# Patient Record
Sex: MALE | Race: WHITE | NOT HISPANIC OR LATINO | Employment: STUDENT | URBAN - METROPOLITAN AREA
[De-identification: names, ages, dates, MRNs, and addresses within clinical notes are randomized per-mention and may not be internally consistent; named-entity substitution may affect disease eponyms.]

---
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
## 2021-02-08 ENCOUNTER — OFFICE VISIT - HEALTHEAST (OUTPATIENT)
Dept: FAMILY MEDICINE | Facility: CLINIC | Age: 21
End: 2021-02-08

## 2021-02-08 DIAGNOSIS — N50.89 TESTICULAR MASS: ICD-10-CM

## 2021-02-08 DIAGNOSIS — R21 RASH OF GENITAL AREA: ICD-10-CM

## 2021-05-26 VITALS — DIASTOLIC BLOOD PRESSURE: 70 MMHG | HEART RATE: 80 BPM | SYSTOLIC BLOOD PRESSURE: 102 MMHG

## 2021-05-30 NOTE — TELEPHONE ENCOUNTER
Patient Returning Call  Reason for call:Cait returning Erika call  Information relayed to patient: below message relayed to patient.   Patient has additional questions:  Yes  If YES, what are your questions/concerns: Patient currently taking  Doxycycline (VIBRAMYCIN) 100 MG capsule   Lyme's disease - all lab results Negative, should patient continue medication, Please call and advise ASAP.   Okay to leave a detailed message?:  Yes

## 2021-05-30 NOTE — TELEPHONE ENCOUNTER
New patient to [facility].    Fever and seen in ED 2 times recently. Apple size red merna on upper thigh Monday and left scrotum. Dx cellulitis.  Temp now 99.  Red merna now bigger.  Blotches all over body started out as mosquito bite size and getting bigger.  Dark in middle. No bullseye. Mother not sure what it is.  Eating less than usual.  Wt 130#.    Severe HA now.    Has  Been taking antibiotic Keflex since Monday and no doses missed.      Mother is very concerned.  Looks weak. No appetite.  Nauseous.  Cellulitis looking worse today.  Today is day 3 of antibiotic and not improving. Center area of cellulitis is black in color.    Triage nurse could not get him into with [facility] today, so referred him to ED again for hydration, nausea meds, and probably IV antibiotics.    Mother agreed with plan.    Anusha Maher, RN, [facility] Nurse Triage/Med Refills RN     Reason for Disposition    Black (necrotic) tissue or blisters develop in cellulitis    Widespread, bright red, sunburn-like rash and new-onset    Cellulitis on ear or face and getting WORSE    Protocols used: CELLULITIS ON ANTIBIOTIC FOLLOW-UP CALL-P-[place]

## 2021-05-30 NOTE — TELEPHONE ENCOUNTER
Test Results  Who is calling?:  Mother   Who ordered the test:  ER Dr Gamble   Type of test: Lab  Date of test: 7/17/19  Where was the test performed:  [facility]  What are your questions/concerns?:  The mother was told to start care for patient and primary provider will give the results for er visit.  The labs are not final.  The mother did not schedule nor will she schedule an upcoming appointment.  Per writer manager encounter closed.   Okay to leave a detailed message?:  No [phone]

## 2021-05-30 NOTE — TELEPHONE ENCOUNTER
Patient is in process of transferring care from [facility]. Patient was seen in ER on 7/17. Mom is wondering about lab results. Looks like one of the lab result is still pending. She would like a call back as soon as lab results come in. Please contact her in regards to this. She has an appt set up with Pamella but could not get in until the 29th of June.

## 2021-05-30 NOTE — TELEPHONE ENCOUNTER
Reason contacted:  Results   Information relayed:  Below message. Patient already has an appointment scheduled on 7/29/2019.   Additional questions:  No  Further follow-up needed:  No  Okay to leave a detailed message:  No

## 2021-05-30 NOTE — TELEPHONE ENCOUNTER
He does not have a PCP.  I would recommend he schedule a f/u appointment with a PCP to discuss his symptoms and treatment options further.

## 2021-05-30 NOTE — TELEPHONE ENCOUNTER
"Mother (Cait) is the caller.  Child had ED eval for fever and R-sided chest pain.  Ruled out pneumonia and discharged.    Mother states:  \"Still having fevers every day since ED eval (48 hours ago).\"  Temp today \"101\".  Every day temps range \"100 to 102.5.\"  Also \"vomiting daily.\"  \"Wakes up drenched with sweat and vomits right away.\"    Mother states \"Today he finally showed me his groin.\"  \"Size of purple grapefruit at L groin, and same huge purple swelling on L testicle.\"   Today tells mother \"Hurts to walk.\"    Advised returning to ED immediately.  Mother agrees to facilitate.    Liya Francisco RN BSBA  [facility] RN Triage     Reason for Disposition    Pain in scrotum or testicle    Swollen scrotum now (Exception: painless swelling goes away when push on it or teen with painless mass in scrotum)    Child sounds very sick or weak to triager     Accompanying fever with vomiting ...    Protocols used: SCROTUM SWELLING OR PAIN-P-OH      "

## 2021-05-30 NOTE — TELEPHONE ENCOUNTER
Who is calling: Cait - mom  Reason for Call: Please call mom with results asap  Patient is a little better, mom questions plan of care- results are in CHART.  Please call her back today.    Date of last appointment with primary care: 7/17/19 ED- Pt has appointment 7/29/19 - [facility]    Has the patient been recently seen:  Yes  Okay to leave a detailed message: Yes

## 2021-06-04 VITALS
HEIGHT: 70 IN | DIASTOLIC BLOOD PRESSURE: 60 MMHG | SYSTOLIC BLOOD PRESSURE: 100 MMHG | HEART RATE: 88 BPM | WEIGHT: 129.44 LBS | BODY MASS INDEX: 18.53 KG/M2

## 2021-06-05 VITALS
SYSTOLIC BLOOD PRESSURE: 108 MMHG | OXYGEN SATURATION: 95 % | DIASTOLIC BLOOD PRESSURE: 72 MMHG | WEIGHT: 135 LBS | BODY MASS INDEX: 19.55 KG/M2 | HEART RATE: 77 BPM

## 2021-06-05 VITALS
SYSTOLIC BLOOD PRESSURE: 120 MMHG | HEART RATE: 92 BPM | DIASTOLIC BLOOD PRESSURE: 80 MMHG | WEIGHT: 132.13 LBS | BODY MASS INDEX: 19.13 KG/M2 | TEMPERATURE: 98.3 F

## 2021-06-11 NOTE — PATIENT INSTRUCTIONS - HE
Passed vision screening  Technically failed part of left ear hearing screen - needs repeat hearing test (referral placed)    Cough: likely related to allergy/irritant in the room  -regularly wash/clean room and bedding  -if symptoms start up then would recommend using oral antihistamine before bed (claritin or allegra but could use benadryl)    Shoulder issue: recommend referral and evaluation with sports med doctor - concern for recurrent bilateral shoulder dislocations (this can be damaging to joints if not addressed)

## 2021-06-11 NOTE — PROGRESS NOTES
ASSESSMENT and PLAN:    #1.  Multidirectional instability of both shoulders.  I am going to refer him to physical therapy for further treatment.  Since he has had multiple dislocations of both shoulders I do not believe that surgery is indicated as the first-line treatment for this.  He will follow-up in 6 to 8 weeks if he is not improved and we can consider further work-up at that time.    Problem List Items Addressed This Visit     None      Visit Diagnoses     Instability of both shoulder joints    -  Primary    Relevant Orders    Ambulatory referral to Adult PT- Internal          There are no Patient Instructions on file for this visit.    There are no discontinued medications.    No follow-ups on file.    CHIEF COMPLAINT:  Chief Complaint   Patient presents with     Shoulder Injury     pt states shoulders keep dislocating       HISTORY OF PRESENT ILLNESS:  Zeferino Juarez is a 20 y.o. male  presenting to the clinic today for evaluation of shoulder dislocations.  He states that these have been going on for about the last year, and and they occur in both shoulders.  He states that he has dislocated both shoulders about 10 times over the last year, and will happen when he is playing sports such as basketball.  He will jumped to try to block a shot and if he externally rotates his shoulder he states that the humeral head will tend to dislocate posteriorly more than anteriorly.  He states that he can move his shoulder around and the dislocation will then subsequently reduce on its own.  He does not have a lot of pain associated with these issues.  Past medical and surgical history reviewed.  Medications and allergies were reconciled.  He works as a .  No history of ligamentous laxity in the family.    REVIEW OF SYSTEMS:   Pertinent positives noted in HPI, remainder of ROS is negative.    MEDICATIONS:  No current outpatient medications on file.     No current facility-administered medications for this  visit.        TOBACCO USE:  Social History     Tobacco Use   Smoking Status Never Smoker   Smokeless Tobacco Never Used   Tobacco Comment    vaping most days       VITALS:  Vitals:    09/29/20 1301   BP: 102/70   Pulse: 80     Wt Readings from Last 3 Encounters:   09/16/20 129 lb 7 oz (58.7 kg) (13 %, Z= -1.11)*   07/17/19 133 lb (60.3 kg) (25 %, Z= -0.66)*   07/15/19 134 lb (60.8 kg) (27 %, Z= -0.61)*     * Growth percentiles are based on [facility] (Boys, 2-20 Years) data.         PHYSICAL EXAM:  Constitutional:  Reveals an alert, pleasant male.   HEET: Normocephalic, without obvious abnormality, atraumatic.   Neurologic: Normal gait and station  Psychologic: Normal affect  Shoulders: Range of motion is full to flexion abduction.  Strength is 5 out of 5 to resisted shoulder abduction, internal rotation, external rotation, and supraspinatus testing.  Apprehension sign is positive bilaterally.  Relocation test is positive bilaterally.  He does have increased translation of the humeral head bilaterally in the anterior, posterior, and inferior planes, right greater than left.

## 2021-06-11 NOTE — PROGRESS NOTES
[facility] Well Child Check    ASSESSMENT & PLAN  Zeferino Juarez is a 19 y.o. who has normal growth and normal development.    Diagnoses and all orders for this visit:    Routine general medical examination at a health care facility  19-year well-child check completed today.  Concerns as below.  Vaccines as below.  -     Influenza, Seasonal Quad, PF =/> 6months  -     Meningococcal MCV4P  -     HPV vaccine 9 valent 3 dose IM    Dislocation of shoulder region, unspecified laterality, initial encounter  Patient describes recurrent bilateral shoulder dislocations with specific movements during sports; grinding of bilateral shoulders appreciated today with range of motion but otherwise normal exam.  Recommend referral to sports medicine for further evaluation and management.  -     Ambulatory referral to Sports Medicine    Failed hearing screening  Patient failed hearing screen x2, recommend referral to audiology for follow-up.  -     Ambulatory referral to Audiology    Cough   Based on patient's description I am all suspicious for allergic/irritant cough.  I recommend regular cleaning of room and washing of bedding.  If symptoms occur I recommend oral antihistamine such as Claritin or Allegra.  Return to clinic as needed for this issue.      Return to clinic in 1 year for a Well Child Check or sooner as needed    IMMUNIZATIONS/LABS  Immunizations were reviewed and orders were placed as appropriate.    REFERRALS  Dental:  Recommend routine dental care as appropriate.  Other:  referrals as above    ANTICIPATORY GUIDANCE  I have reviewed age appropriate anticipatory guidance.    HEALTH HISTORY  Do you have any concerns that you'd like to discuss today?: cough and shoulder problem   Cough: usually at night sometimes, started a couple years ago, not present all the time (only when in his room), watery eyes sometimes, sometimes congestion but not necessarily connected, approx 1/week, never picked up/tried albuterol inhaler  (was given in ER July 2019)  Shoulder issue: bilateral shoulder issues, reports falling on R arm and shoulder pops out of socket he feels, same issue on L side but no history injury, started a few months ago (no hx similar issues when playing sports in the past), feels grinding, feels pain when pops out    Do you have any significant health concerns in your family history?: Yes: lung cancer and COPD   Since your last visit, have there been any major changes in your family, such as a move, job change, separation, divorce, or death in the family?: No  Has a lack of transportation kept you from medical appointments?: No    Home  Who lives in your home?:  Mom, Dad, sister and brother     Do you have any concerns about losing your housing?: No  Is your housing safe and comfortable?: Yes  Do you have any trouble with sleep?:  No    Education  What school do you child attend?:  [employer]  What grade are you in?:  collage   How do you perform in school (grades, behavior, attention, homework?: good      Eating  Do you eat regular meals including fruits and vegetables?:  yes  What are you drinking (cow's milk, water, soda, juice, sports drinks, energy drinks, etc)?: water and juice  Have you been worried that you don't have enough food?: No  Do you have concerns about your body or appearance?:  No    Activities  Do you have friends?:  yes  Do you get at least one hour of physical activity per day?:  yes  How many hours a day are you in front of a screen other than for schoolwork (computer, TV, phone)?:  5hr   What do you do for exercise?:  Basketball   Do you have interest/participate in community activities/volunteers/school sports?:  yes    VISION/HEARING  Vision: Completed. See Results  Hearing:  Completed. See Results     Hearing Screening    125Hz 250Hz 500Hz 1000Hz 2000Hz 3000Hz 4000Hz 6000Hz 8000Hz   Right ear:   Pass Pass Pass Pass Pass     Left ear:   Pass Pass Pass Fail Pass        Visual Acuity Screening     "Right eye Left eye Both eyes   Without correction: 20/20 20/20 20/20   With correction:          MENTAL HEALTH SCREENING  No flowsheet data found.  Social-emotional & mental health screening: No screening tool used  No concerns    TB Risk Assessment:  The patient and/or parent/guardian answer positive to:  no known risk of TB    Dyslipidemia Risk Screening  Have either of your parents or any of your grandparents had a stroke or heart attack before age 55?: No  Any parents with high cholesterol or currently taking medications to treat?: No     Dental  When was the last time you saw the dentist?: 6-12 months ago   Parent/Guardian declines the fluoride varnish application today. Fluoride not applied today.      Drugs  Does the patient use tobacco/alcohol/drugs?:  no    Safety  Does the patient have any safety concerns (peer or home)?:  no  Does the patient use safety belts, helmets and other safety equipment?:  yes    Sex  Have you ever had sex?:  No    MEASUREMENTS  Height:  5' 9.69\" (1.77 m)  Weight: 129 lb 7 oz (58.7 kg)  BMI: Body mass index is 18.74 kg/m .  Blood Pressure: 100/60  Blood pressure percentiles are not available for patients who are 18 years or older.    PHYSICAL EXAM  Physical Exam   Constitutional: He is oriented to person, place, and time. He appears well-developed and well-nourished. No distress.   HENT:   Head: Normocephalic and atraumatic.   Right Ear: External ear normal.   Left Ear: External ear normal.   Nose: Nose normal.   Mouth/Throat: Oropharynx is clear and moist.   Eyes: Pupils are equal, round, and reactive to light. Conjunctivae and EOM are normal. Right eye exhibits no discharge. Left eye exhibits no discharge. No scleral icterus.   Neck: Normal range of motion. Neck supple. No thyromegaly present.   Cardiovascular: Normal rate, regular rhythm and normal heart sounds.   No murmur heard.  Pulmonary/Chest: Effort normal and breath sounds normal. No respiratory distress. He has no " wheezes. He has no rales.   Abdominal: Soft. Bowel sounds are normal. He exhibits no distension and no mass. There is no abdominal tenderness. There is no rebound and no guarding.   Genitourinary: Right testis is descended. Left testis is descended.    Genitourinary Comments: Pt deferred exam     Musculoskeletal: Normal range of motion.         General: No edema.      Comments: No joint swelling or deformity. Grinding with bilateral shoulder ROM.   Lymphadenopathy:     He has no cervical adenopathy.   Neurological: He is alert and oriented to person, place, and time. He has normal reflexes. He exhibits normal muscle tone. Coordination normal.   Skin: Skin is warm and dry. No rash noted. He is not diaphoretic.   Psychiatric: He has a normal mood and affect. His behavior is normal. Judgment and thought content normal.

## 2021-06-12 NOTE — PROGRESS NOTES
[facility] Discharge Summary  Patient Name: Zeferino Juarez  Date: 1/11/2021  Referral Diagnosis: [unfilled]  Referring provider: Armen Diggs, *  Visit Diagnosis:   1. Poor posture     2. Instability of both shoulder joints         Goals:  Pt. will demonstrate/verbalize independence in self-management of condition in : 4 weeks    Pt will: improve SPADI by 10% in 8 weeks  Pt will: reduce subluxing episodes by 1x/week in 8 weeks      Patient was seen for  visits from initial eval and NS'd 2 follow-ups  Therapy will be discontinued at this time.  The patient will need a new referral to resume.    Thank you for your referral.  Leia Zuñiga  1/11/2021  1:41 PM  [facility]   Shoulder Initial Evaluation    Patient Name: Zeferino Juarez  Date of evaluation: 10/=29/2020  Referral Diagnosis: Instability of both shoulder joints  Referring provider: Armen Diggs, *  Visit Diagnosis:     ICD-10-CM    1. Poor posture  R29.3    2. Instability of both shoulder joints  M25.311     M25.312        Assessment:    Patient is presenting with reports of instability and B shoulder subluxation. He notes the primary movement to cause theses sensation- reaching under or farther for an object or reaching up for a basketball at end range flexion. PT eval reveals severely poor posture (scapular and thoracic but also hyperextending elbows) that is contributing to his condition and positive fulcrum and relocation tests. No other hypermobility noted on exam and normal static strength. PT initiated HEP and will see the patinet once every two weeks for further exercise progression.    Pt. is appropriate for skilled PT intervention as outlined in the Plan of Care (POC).  Pt. is a good candidate for skilled PT services to improve pain levels and function.    Goals:  Pt. will demonstrate/verbalize independence in self-management of condition in : 4 weeks    Pt will: improve SPADI by 10% in 8 weeks  Pt  "will: reduce subluxing episodes by 1x/week in 8 weeks      Patient's expectations/goals are realistic.    Barriers to Learning or Achieving Goals:  No Barriers.       Plan / Patient Instructions:        Plan of Care:   Communication with: Referral Source  Patient Related Instruction: Nature of Condition;Treatment plan and rationale;Basis of treatment;Posture  Times per Week: 1  Number of Weeks: 8  Number of Visits: 8  Discharge Planning: to independent home program and self-care  Therapeutic Exercise: Strengthening;Stretching;ROM  Neuromuscular Reeducation: posture  Manual Therapy: myofascial release;soft tissue mobilization;joint mobilization  Modalities: cold pack;hot pack      POC and pathology of condition were reviewed with patient.  Pt. is in agreement with the Plan of Care  A Home Exercise Program (HEP) was initiated today.  Plan for next visit: STEFANO ADEN stability, Band shoulder ER, review and correct scapular positioning  .   Subjective:       History of Present Illness:    Zeferino is a 20 y.o. male who presents to therapy today with complaints of B shoulder instability more in the R> L. He does think that he is double jointed.  No noted injuries with lacrosse. Feelings of instability with reaching to get something under the bed. HE    He notes them to \"pop\" out and that's started this year, occurs more when arm is extended above his head.  He does have access to the gym if needed.     No pain.      Functional limitations are described as occurring with:   sports or recreation playing basketball    Patient reports benefit from:  rest         Objective:      Note: Items left blank indicates the item was not performed or not indicated at the time of the evaluation.    Patient Outcome Measures :    Shoulder Pain and Disability Index (SPADI) in %: 17     Scores range from 0-100%, where a score of 0% represents minimal pain and maximal function. The minimal clincically important difference is a score reduction of " 10%.    Shoulder Examination  1. Poor posture     2. Instability of both shoulder joints       Involved side: Right  Posture Observation:      Shoulder/Thoracic complex: Severe bilateral scapular protraction   Bilateral upper extremity  elbow hyoerextension   Severely increased upper thoracic kyphosis  Cervical Clearing: Normal    Shoulder/Elbow ROM    Date: 10/29/20     Shoulder and Elbow ROM ( )   AROM in degrees AROM in degrees AROM in degrees    Right Left Right Left Right Left   Shoulder Flexion (0-180 )         Shoulder Abduction (0-180 )         Shoulder Extension (0-60 )         Shoulder ER (0-90 )         Shoulder IR (0-70 )         Shoulder IR/Ext         Elbow Flexion (150 )         Elbow Extension (0 )          PROM in degrees PROM in degrees PROM in degrees    Right Left Right Left Right Left   Shoulder Flexion (0-180 )         Shoulder Abduction (0-180 )         Shoulder Extension (0-60 )         Shoulder ER (0-90 )         Shoulder IR (0-70 )         Elbow Flexion (150 )         Elbow Extension (0 )           Shoulder/Elbow Strength all 5/5  Date:      Shoulder/Elbow Strength (/5)  Manual Muscle Test (MMT) MMT MMT MMT    Right Left Right Left Right Left   Shoulder Flexion         Supraspinatus         Shoulder Abduction         Shoulder Extension         Shoulder External Rotation         Shoulder Internal Rotation         Elbow Flexion         Elbow Extension         Other:         Other:           Flexibility:     Palpation: moderately increased joint play at B shoulders, thumb WFL's    Shoulder Special Tests     Impingement Cluster Right (+/-) Left (+/-) Rotator Cuff Tests Right (+/-) Left (+/-)   Moreno-Ovi   Drop Arm Sign     Painful Arc   Hornblowers     Infraspinatus Test   ERLS     AC Tests Right (+/-) Left (+/-) IRLS     Active Compression   Labral Tests Right (+/-) Left (+/-)   Crossbody Adduction   Biceps Load Test II     AC Resisted Extension   Jerk Test     GH Instability Tests Right  (+/-) Left (+/-) Celena Test     Sulcus Sign   Biceps Tests Right (+/-) Left (+/-)   Apprehension + + Speed     Relocation + + Lucia dahl     Other:   Other:     Other:   Other:         Treatment Today      Exercises: see flow sheet for date performed  Exercise #1: supine reverse shoulder pendulums x 2 minutes CW/CCW with 5# B  Comment #1: Standing Shoulder flexion and ABD to 90 degrees with 5#  Exercise #2: Shoulder extension and scap setx 15 with green band  Comment #2: All fours- arm extension x 10/arm with 5seconds hold      TREATMENT MINUTES COMMENTS   Evaluation 20    Self-care/ Home management     Manual therapy     Neuromuscular Re-education     Therapeutic Activity     Therapeutic Exercises 25 See flow sheet  Educated extensively on scapular protraction with arm movement and elbow hyper extension on WBing   Gait training     Modality__________________                Total 45    Blank areas are intentional and mean the treatment did not include these items.     PT Evaluation Code: (Please list factors)  Patient History/Comorbidities: There is no problem list on file for this patient.      Examination: brad  Clinical Presentation: stable  Clinical Decision Making: low    Patient History/  Comorbidities Examination  (body structures and functions, activity limitations, and/or participation restrictions) Clinical Presentation Clinical Decision Making (Complexity)   No documented Comorbidities or personal factors 1-2 Elements Stable and/or uncomplicated Low   1-2 documented comorbidities or personal factor 3 Elements Evolving clinical presentation with changing characteristics Moderate   3-4 documented comorbidities or personal factors 4 or more Unstable and unpredictable High                Leia Zuñiga  10/30/2020  3:17 PM

## 2021-06-13 NOTE — PROGRESS NOTES
Assessment/Plan:    1. Rash  Suspect fungal infection, unfortunately hasn't improved with lotrimin. Recommend trial miconazole cream + fluconazole oral as below; discussed tips to promote healing and minimize moisture in this area. If not resolved with this treatment then would consider trial of topical steroid.  - fluconazole (DIFLUCAN) 150 MG tablet; Take 1 tablet (150 mg total) by mouth daily.  Dispense: 7 tablet; Refill: 0  - miconazole (MICATIN) 2 % cream; Apply to affected area 2 times daily for 5-7 days  Dispense: 15 g; Refill: 0      Follow up: as needed    Tricia Mejias MD  [facility]    Subjective:    Patient ID: Zeferino Juarez is a 20 y.o. male is here today for rash    Rash  -started 1-1.5 months ago  -initially thought was getting better but wasn't - lotrimin cream   -in groin  -itchy  -red  -no pus  -no significant change but may have gotten redder over time      Past Medical History:   Diagnosis Date     Lyme disease     2019 - resolved     Past Surgical History:   Procedure Laterality Date     NO PAST SURGERIES       No current outpatient medications on file prior to visit.     No current facility-administered medications on file prior to visit.      No Known Allergies  Social History     Socioeconomic History     Marital status: Single     Spouse name: Not on file     Number of children: Not on file     Years of education: Not on file     Highest education level: Not on file   Occupational History     Not on file   Social Needs     Financial resource strain: Not on file     Food insecurity     Worry: Not on file     Inability: Not on file     Transportation needs     Medical: Not on file     Non-medical: Not on file   Tobacco Use     Smoking status: Never Smoker     Smokeless tobacco: Never Used     Tobacco comment: vaping most days   Substance and Sexual Activity     Alcohol use: Never     Frequency: Never     Drug use: Never     Sexual activity: Never   Lifestyle     Physical  activity     Days per week: Not on file     Minutes per session: Not on file     Stress: Not on file   Relationships     Social connections     Talks on phone: Not on file     Gets together: Not on file     Attends Confucianism service: Not on file     Active member of club or organization: Not on file     Attends meetings of clubs or organizations: Not on file     Relationship status: Not on file     Intimate partner violence     Fear of current or ex partner: Not on file     Emotionally abused: Not on file     Physically abused: Not on file     Forced sexual activity: Not on file   Other Topics Concern     Not on file   Social History Narrative     Not on file     Family History   Problem Relation Age of Onset     No Medical Problems Mother      No Medical Problems Father      No Medical Problems Sister      No Medical Problems Brother      No Medical Problems Maternal Grandmother      No Medical Problems Maternal Grandfather      Other Paternal Grandmother         lung issue     No Medical Problems Paternal Grandfather      Review of systems is as stated in HPI, and the remainder of system review is otherwise negative.    Objective:      /80   Pulse 92   Temp 98.3  F (36.8  C)   Wt 132 lb 2 oz (59.9 kg)   BMI 19.13 kg/m      General appearance: awake, NAD  HEENT: atraumatic, normocephalic, no scleral icterus or injection  Lungs: breathing comfortably on room air  : erythematous macular rash on scrotum and groin, no papules/pustules, no drainage, no folliculitis  Extremities: moving all extremities  Neuro: alert, oriented x3, CNs grossly intact, no focal deficits appreciated  Psych: normal mood/affect/behavior, answering questions appropriately, linear thought process

## 2021-06-13 NOTE — PATIENT INSTRUCTIONS - HE
Rash looks like fungal infection - since this has been going on so long and no significant improvement with lotrimin, will be more aggressive with management now  -start fluconazole (diflucan) 150mg - take 1 tablet daily for 7 days - make sure to take full course  -start miconazole cream two times a day - can stop this early if rash goes away    Call or send Pirq message with update if rash isn't gone in approximately 1 week because then we would consider trying different type of cream/treatment

## 2021-06-15 NOTE — PROGRESS NOTES
Assessment & Plan     Rash of genital area  This is of unclear significance and has not responded to several different antifungals or steroid medications.  Instead of prescribing another topical or oral treatment, referral placed to dermatology per patient preference.  - Ambulatory referral to Dermatology - [facility] [place]    Testicular mass  Not appreciated on exam today.  If it returns, low threshold to ultrasound.        No follow-ups on file.    Lucille Montiel MD  [facility] Ju[facility] is 20 y.o. and presents to clinic today for the following health issues   HPI     Patient complains of a tender left testicle.  He believes there is a small pebble sized mass located there for approximately 3 weeks that eventually self resolved.  He wonders if it drained.  It is no longer tender.    Patient also complains of a rash that comes and goes on his scrotum and shaft of penis.  He has already tried fluconazole, miconazole, clotrimazole, and a steroid.      Review of Systems  none      Objective    /72   Pulse 77   Wt 135 lb (61.2 kg)   SpO2 95%   BMI 19.55 kg/m    Body mass index is 19.55 kg/m .  Physical Exam  Gen: NAD  : No obvious palpable mass appreciated in the scrotum, bilateral testes appear even and are nontender to palpation, no significant evidence for epididymitis, there is an area of poorly defined erythema extending from the top of the left testicle up the shaft for a total length of approximately 2 to 3 inches

## 2021-06-19 ENCOUNTER — HEALTH MAINTENANCE LETTER (OUTPATIENT)
Age: 21
End: 2021-06-19

## 2021-10-09 ENCOUNTER — HEALTH MAINTENANCE LETTER (OUTPATIENT)
Age: 21
End: 2021-10-09

## 2021-12-04 ENCOUNTER — HEALTH MAINTENANCE LETTER (OUTPATIENT)
Age: 21
End: 2021-12-04

## 2022-05-09 ENCOUNTER — TRANSFERRED RECORDS (OUTPATIENT)
Dept: HEALTH INFORMATION MANAGEMENT | Facility: CLINIC | Age: 22
End: 2022-05-09
Payer: COMMERCIAL

## 2022-08-09 ENCOUNTER — TRANSFERRED RECORDS (OUTPATIENT)
Dept: HEALTH INFORMATION MANAGEMENT | Facility: CLINIC | Age: 22
End: 2022-08-09

## 2022-08-26 ENCOUNTER — VIRTUAL VISIT (OUTPATIENT)
Dept: FAMILY MEDICINE | Facility: CLINIC | Age: 22
End: 2022-08-26
Payer: COMMERCIAL

## 2022-08-26 DIAGNOSIS — N52.9 VASCULOGENIC ERECTILE DYSFUNCTION, UNSPECIFIED VASCULOGENIC ERECTILE DYSFUNCTION TYPE: Primary | ICD-10-CM

## 2022-08-26 PROCEDURE — 99214 OFFICE O/P EST MOD 30 MIN: CPT | Mod: GT | Performed by: FAMILY MEDICINE

## 2022-08-26 NOTE — PROGRESS NOTES
Zeferino is a 21 year old who is being evaluated via a billable video visit.      How would you like to obtain your AVS? MyChart  If the video visit is dropped, the invitation should be resent by: Text to cell phone: [phone]  Will anyone else be joining your video visit? No          Assessment & Plan     Vasculogenic erectile dysfunction, unspecified vasculogenic erectile dysfunction type  Patition due to vascular congestion times one. He's worried about this recurring; appropriate counseling was given. He really should come in for a face-to-face visit. He is not a candidate for Viagra type medicationent experienced loss of erec      Optional):[id number]}           No follow-ups on file.    David Ibrahim MD  [facility]    Subjective t experienced loss of erection times one due to vascular congestion and is concerned that this may be a recurring problem. He appears well and healthy. I did discuss causative factors. I feel he should have face-to-face counseling for this shouldn't recur. He did ask eleanor about the fact that he should not take Viagra type drugs at this age in this age group.Follow up with [facility]          Review of Systems   Constitutional, HEENT, cardiovascular, pulmonary, gi and gu systems are negative, except as otherwise noted.      Objective           Vitals:  No vitals were obtained today due to virtual visit.    Physical Exam   GENERAL: Healthy, alert and no distress  EYES: Eyes grossly normal to inspection.  No discharge or erythema, or obvious scleral/conjunctival abnormalities.  RESP: No audible wheeze, cough, or visible cyanosis.  No visible retractions or increased work of breathing.    SKIN: Visible skin clear. No significant rash, abnormal pigmentation or lesions.  NEURO: Cranial nerves grossly intact.  Mentation and speech appropriate for age.  PSYCH: Mentation appears normal, affect normal/bright, judgement and insight intact, normal speech and appearance  well-groomed.                Video-Visit Details    Video Start Time: 10:50 start 11:10 end;LAVON pearson

## 2022-09-11 ENCOUNTER — HEALTH MAINTENANCE LETTER (OUTPATIENT)
Age: 22
End: 2022-09-11

## 2022-09-21 ENCOUNTER — OFFICE VISIT (OUTPATIENT)
Dept: FAMILY MEDICINE | Facility: CLINIC | Age: 22
End: 2022-09-21
Payer: COMMERCIAL

## 2022-09-21 VITALS
TEMPERATURE: 98 F | WEIGHT: 151 LBS | DIASTOLIC BLOOD PRESSURE: 80 MMHG | HEART RATE: 75 BPM | SYSTOLIC BLOOD PRESSURE: 110 MMHG | OXYGEN SATURATION: 100 % | BODY MASS INDEX: 21.62 KG/M2 | HEIGHT: 70 IN

## 2022-09-21 DIAGNOSIS — M25.311 INSTABILITY OF RIGHT SHOULDER JOINT: ICD-10-CM

## 2022-09-21 DIAGNOSIS — M24.411 RECURRENT SUBLUXATION OF RIGHT SHOULDER: ICD-10-CM

## 2022-09-21 DIAGNOSIS — Z01.818 PREOP EXAMINATION: Primary | ICD-10-CM

## 2022-09-21 LAB
ANION GAP SERPL CALCULATED.3IONS-SCNC: 13 MMOL/L (ref 7–15)
BUN SERPL-MCNC: 14.1 MG/DL (ref 6–20)
CALCIUM SERPL-MCNC: 9.4 MG/DL (ref 8.6–10)
CHLORIDE SERPL-SCNC: 104 MMOL/L (ref 98–107)
CREAT SERPL-MCNC: 0.88 MG/DL (ref 0.67–1.17)
DEPRECATED HCO3 PLAS-SCNC: 25 MMOL/L (ref 22–29)
ERYTHROCYTE [DISTWIDTH] IN BLOOD BY AUTOMATED COUNT: 11.6 % (ref 10–15)
GFR SERPL CREATININE-BSD FRML MDRD: >90 ML/MIN/1.73M2
GLUCOSE SERPL-MCNC: 92 MG/DL (ref 70–99)
HCT VFR BLD AUTO: 45.7 % (ref 40–53)
HGB BLD-MCNC: 15.7 G/DL (ref 13.3–17.7)
MCH RBC QN AUTO: 29.3 PG (ref 26.5–33)
MCHC RBC AUTO-ENTMCNC: 34.4 G/DL (ref 31.5–36.5)
MCV RBC AUTO: 85 FL (ref 78–100)
PLATELET # BLD AUTO: 223 10E3/UL (ref 150–450)
POTASSIUM SERPL-SCNC: 4.4 MMOL/L (ref 3.4–5.3)
RBC # BLD AUTO: 5.35 10E6/UL (ref 4.4–5.9)
SODIUM SERPL-SCNC: 142 MMOL/L (ref 136–145)
WBC # BLD AUTO: 6.6 10E3/UL (ref 4–11)

## 2022-09-21 PROCEDURE — 85027 COMPLETE CBC AUTOMATED: CPT | Performed by: FAMILY MEDICINE

## 2022-09-21 PROCEDURE — 99214 OFFICE O/P EST MOD 30 MIN: CPT | Performed by: FAMILY MEDICINE

## 2022-09-21 PROCEDURE — 36415 COLL VENOUS BLD VENIPUNCTURE: CPT | Performed by: FAMILY MEDICINE

## 2022-09-21 PROCEDURE — 80048 BASIC METABOLIC PNL TOTAL CA: CPT | Performed by: FAMILY MEDICINE

## 2022-09-21 ASSESSMENT — PAIN SCALES - GENERAL: PAINLEVEL: NO PAIN (0)

## 2022-09-21 NOTE — PROGRESS NOTES
[facility]  [address] [facility] [address]  [facility] [address]  Phone: [phone]  Fax: [phone]  Primary Provider: Tricia Mejias    PREOPERATIVE EVALUATION:  Today's date: 9/21/2022    Zeferino Juarez is a 21 year old male who presents for a preoperative evaluation.    Surgical Information:  Surgery/Procedure: Shoulder surgery - R shoulder arthroscopy, bankart repair, SLAP repair  Surgery Location: [facility]   Surgeon: Patrick Sun   Surgery Date: 09/29/2022  Time of Surgery: not yet determined  Where patient plans to recover: At home with family  Fax number for surgical facility: [phone]    Type of Anesthesia Anticipated: to be determined    Assessment & Plan     The proposed surgical procedure is considered INTERMEDIATE risk.    Preop examination  Instability of right shoulder joint  Recurrent subluxation of right shoulder  Preop exam completed today for upcoming R shoulder surgery. Labs as below. No EKG indicated. Pt not taking any medications - reviewed no ibuprofen for 7 days before surgery.   - CBC with platelets  - Basic metabolic panel  (Ca, Cl, CO2, Creat, Gluc, K, Na, BUN)      Risks and Recommendations:  The patient has the following additional risks and recommendations for perioperative complications:   - No identified additional risk factors other than previously addressed    Medication Instructions:  Patient is on no chronic medications    RECOMMENDATION:  APPROVAL GIVEN to proceed with proposed procedure, without further diagnostic evaluation.      Subjective     HPI related to upcoming procedure: bilateral shoulder instability - starting with R shoulder - 6 weeks in sling, 5 months before return sports    Preop Questions 9/21/2022   1. Have you ever had a heart attack or stroke? No   2. Have you ever had surgery on your heart or blood vessels, such as a stent placement, a coronary artery bypass, or surgery on an artery in  your head, neck, heart, or legs? No   3. Do you have chest pain with activity? No   4. Do you have a history of  heart failure? No   5. Do you currently have a cold, bronchitis or symptoms of other infection? No   6. Do you have a cough, shortness of breath, or wheezing? No   7. Do you or anyone in your family have previous history of blood clots? No   8. Do you or does anyone in your family have a serious bleeding problem such as prolonged bleeding following surgeries or cuts? No   9. Have you ever had problems with anemia or been told to take iron pills? No   10. Have you had any abnormal blood loss such as black, tarry or bloody stools? No   11. Have you ever had a blood transfusion? No   12. Are you willing to have a blood transfusion if it is medically needed before, during, or after your surgery? Yes   13. Have you or any of your relatives ever had problems with anesthesia? No   14. Do you have sleep apnea, excessive snoring or daytime drowsiness? No   15. Do you have any artifical heart valves or other implanted medical devices like a pacemaker, defibrillator, or continuous glucose monitor? No   16. Do you have artificial joints? No   17. Are you allergic to latex? No       Health Care Directive:  Patient does not have a Health Care Directive or Living Will: full code    Preoperative Review of :   reviewed - no record of controlled substances prescribed.    Review of Systems  Constitutional, neuro, ENT, endocrine, pulmonary, cardiac, gastrointestinal, genitourinary, musculoskeletal, integument and psychiatric systems are negative, except as otherwise noted.    There are no problems to display for this patient.     Past Medical History:   Diagnosis Date     Lyme disease     2019 - resolved     Past Surgical History:   Procedure Laterality Date     NO PAST SURGERIES       Current Outpatient Medications   Medication Sig Dispense Refill     IBUPROFEN PO Take 200 mg by mouth every 6 hours as needed for  "moderate pain (Patient not taking: Reported on 9/21/2022)         No Known Allergies     Social History     Tobacco Use     Smoking status: Never Smoker     Smokeless tobacco: Never Used     Tobacco comment: vaping most days   Substance Use Topics     Alcohol use: Never     Family History   Problem Relation Age of Onset     Anesthesia Reaction Mother      Amblyopia Sister      No Known Problems Mother      No Known Problems Father      No Known Problems Sister      No Known Problems Brother      No Known Problems Maternal Grandmother      No Known Problems Maternal Grandfather      Other - See Comments Paternal Grandmother         lung issue     No Known Problems Paternal Grandfather      History   Drug Use Unknown         Objective     /80   Pulse 75   Temp 98  F (36.7  C)   Ht 1.765 m (5' 9.5\")   Wt 68.5 kg (151 lb)   SpO2 100%   BMI 21.98 kg/m      Physical Exam    GENERAL APPEARANCE: healthy, alert and no distress     EYES: EOMI,  PERRL     HENT: ear canals and TM's normal      NECK: no adenopathy, no asymmetry, masses, or scars and thyroid normal to palpation     RESP: lungs clear to auscultation - no rales, rhonchi or wheezes     CV: regular rates and rhythm, normal S1 S2, no S3 or S4 and no murmur, click or rub     MS: extremities normal- no gross deformities noted, no evidence of inflammation in joints, FROM in all extremities.     SKIN: no suspicious lesions or rashes     NEURO: Normal strength and tone, sensory exam grossly normal, mentation intact and speech normal     PSYCH: mentation appears normal. and affect normal/bright     LYMPHATICS: No cervical adenopathy    Diagnostics:   09/21/22 11:00   WBC 6.6   Hemoglobin 15.7   Hematocrit 45.7   Platelet Count 223   RBC Count 5.35   MCV 85   MCH 29.3   MCHC 34.4   RDW 11.6      09/21/22 11:00   Sodium 142   Potassium 4.4   Chloride 104   Carbon Dioxide (CO2) 25   Urea Nitrogen 14.1   Creatinine 0.88   GFR Estimate >90   Calcium 9.4   Anion Gap " 13   Glucose 92       No EKG indicated    Revised Cardiac Risk Index (RCRI):  The patient has the following serious cardiovascular risks for perioperative complications:   - No serious cardiac risks = 0 points     RCRI Interpretation: 0 points: Class I (very low risk - 0.4% complication rate)         Signed Electronically by: Tricia Mejias MD  Copy of this evaluation report is provided to requesting physician.

## 2022-09-29 ENCOUNTER — TRANSFERRED RECORDS (OUTPATIENT)
Dept: HEALTH INFORMATION MANAGEMENT | Facility: CLINIC | Age: 22
End: 2022-09-29

## 2022-10-10 ENCOUNTER — TRANSFERRED RECORDS (OUTPATIENT)
Dept: HEALTH INFORMATION MANAGEMENT | Facility: CLINIC | Age: 22
End: 2022-10-10

## 2022-11-07 ENCOUNTER — TRANSFERRED RECORDS (OUTPATIENT)
Dept: HEALTH INFORMATION MANAGEMENT | Facility: CLINIC | Age: 22
End: 2022-11-07

## 2023-01-05 ENCOUNTER — TRANSFERRED RECORDS (OUTPATIENT)
Dept: HEALTH INFORMATION MANAGEMENT | Facility: CLINIC | Age: 23
End: 2023-01-05

## 2023-01-23 ENCOUNTER — HEALTH MAINTENANCE LETTER (OUTPATIENT)
Age: 23
End: 2023-01-23

## 2023-01-24 ENCOUNTER — MYC MEDICAL ADVICE (OUTPATIENT)
Dept: FAMILY MEDICINE | Facility: CLINIC | Age: 23
End: 2023-01-24
Payer: COMMERCIAL

## 2023-01-25 NOTE — TELEPHONE ENCOUNTER
"From Office visit 2/8/21:  \"Rash of genital area  This is of unclear significance and has not responded to several different antifungals or steroid medications.  Instead of prescribing another topical or oral treatment, referral placed to dermatology per patient preference.  - Ambulatory referral to Dermatology - [facility] [place]\"    Should patient be seen in clinic first? Referral pended; please advise if appropriate. Unsure of provider's name as it was not listed with above info.    Jose Sher, LUCYN, RN  [facility]          "

## 2024-02-24 ENCOUNTER — HEALTH MAINTENANCE LETTER (OUTPATIENT)
Age: 24
End: 2024-02-24

## 2024-05-01 ENCOUNTER — OFFICE VISIT (OUTPATIENT)
Dept: FAMILY MEDICINE | Facility: CLINIC | Age: 24
End: 2024-05-01
Payer: COMMERCIAL

## 2024-05-01 VITALS
WEIGHT: 158.4 LBS | BODY MASS INDEX: 22.68 KG/M2 | OXYGEN SATURATION: 99 % | DIASTOLIC BLOOD PRESSURE: 79 MMHG | TEMPERATURE: 98.7 F | SYSTOLIC BLOOD PRESSURE: 118 MMHG | HEART RATE: 86 BPM | RESPIRATION RATE: 16 BRPM | HEIGHT: 70 IN

## 2024-05-01 DIAGNOSIS — R05.1 ACUTE COUGH: Primary | ICD-10-CM

## 2024-05-01 PROCEDURE — 99214 OFFICE O/P EST MOD 30 MIN: CPT | Performed by: FAMILY MEDICINE

## 2024-05-01 RX ORDER — AZITHROMYCIN 250 MG/1
TABLET, FILM COATED ORAL
Qty: 6 TABLET | Refills: 0 | Status: SHIPPED | OUTPATIENT
Start: 2024-05-01 | End: 2024-05-06

## 2024-05-01 RX ORDER — ALBUTEROL SULFATE 90 UG/1
2 AEROSOL, METERED RESPIRATORY (INHALATION) EVERY 6 HOURS PRN
Qty: 18 G | Refills: 3 | Status: SHIPPED | OUTPATIENT
Start: 2024-05-01 | End: 2024-07-15

## 2024-05-01 NOTE — PROGRESS NOTES
Assessment/ Plan     1. Acute cough  Zeferino has about 10-day history of nonproductive cough.  I suspect he has a postviral cough.  Unfortunately do not have x-ray here today so we will err on the side of caution with putting him on azithromycin.  Will have him start an albuterol inhaler.  Went over inhaler technique with him.  It sounds like he might have sort of intermittent prolonged cough and possibly has some mild intermittent asthma.  Would recommend that he follow-up with his primary care physician if his cough is not resolving.  He does also need a physical and update on some immunizations.  Would defer on immunizations today as we will be putting him on antibiotics and he is acutely ill.  - azithromycin (ZITHROMAX) 250 MG tablet; Take 2 tablets (500 mg) by mouth daily for 1 day, THEN 1 tablet (250 mg) daily for 4 days.  Dispense: 6 tablet; Refill: 0  - albuterol (PROAIR HFA/PROVENTIL HFA/VENTOLIN HFA) 108 (90 Base) MCG/ACT inhaler; Inhale 2 puffs into the lungs every 6 hours as needed for shortness of breath, wheezing or cough  Dispense: 18 g; Refill: 3    He has a rash behind his ears and groin area.  Recommended combination of 1% hydrocortisone cream and an antifungal cream twice daily.  Subjective:      Zeferino Juarez is a 23 year old male who presents for evaluation of a cough.  He states has been coughing for about 10 days.  He does not initially think that he had a type of virus, but now his sister is coughing so he thinks it might have been something contagious.  He did have a little bit of sore throat when it started.  Has not had any fever or shortness of breath.  He states is worse at night and has a hard time falling asleep and then it keeps him up at night.  It is occasionally productive of somewhat yellow sputum.  He states for the last 6 years or so he has had this intermittent cough that will come for couple months and then goes away and then he will get it back again.  He has not really been  "able to figure out a trigger such as time of year, allergies, virus, etc.  It sounds like he could have some mild intermittent asthma and this can be worked up in the future when he is not acutely ill.  He also wanted to ask me about treatment options for her rash.  He gets kind of a scaly rash behind his ears and then he gets a little bit of a red scaly rash in the groin area.    Relevant past medical, family, surgical, and social history reviewed with patient, unless noted in HPI, not pertinent for this visit.  Medications were discussed and reconciled.   Review of Systems   A 12 point comprehensive review of systems was negative except as noted.      Current Outpatient Medications   Medication Sig Dispense Refill    albuterol (PROAIR HFA/PROVENTIL HFA/VENTOLIN HFA) 108 (90 Base) MCG/ACT inhaler Inhale 2 puffs into the lungs every 6 hours as needed for shortness of breath, wheezing or cough 18 g 3    azithromycin (ZITHROMAX) 250 MG tablet Take 2 tablets (500 mg) by mouth daily for 1 day, THEN 1 tablet (250 mg) daily for 4 days. 6 tablet 0         Objective:     /79   Pulse 86   Temp 98.7  F (37.1  C)   Resp 16   Ht 1.765 m (5' 9.5\")   Wt 71.8 kg (158 lb 6.4 oz)   SpO2 99%   BMI 23.06 kg/m      Body mass index is 23.06 kg/m .       General appearance: alert, appears stated age and cooperative  Head: Normocephalic, without obvious abnormality, atraumatic  Eyes: conjunctivae/corneas clear. PERRL, EOM's intact. Fundi benign.  Ears: normal TM's and external ear canals both ears  Nose: Nares normal. Septum midline. Mucosa normal. No drainage or sinus tenderness.  Throat: lips, mucosa, and tongue normal; teeth and gums normal  Neck: no adenopathy.  Lungs: clear to auscultation bilaterally, frequent tight sounding cough  Heart: regular rate and rhythm, S1, S2 normal, no murmur, click, rub or gallop      No results found for this or any previous visit (from the past 168 hour(s)).       This note has been " dictated using voice recognition software. Any grammatical or context distortions are unintentional and inherent to the software

## 2024-05-01 NOTE — PATIENT INSTRUCTIONS
For the fist week, use your inhaler 4 times daily and after that, as needed.  Follow up with Dr Mejias if the cough is not getting better to discuss next steps and you can also schedule a physical with her when you are feeling better    For the rash - I would use both 1% hydrocortisone cream and an antifungal cream twice daily (lotrimin, lamisil, or lotrimin ultra).  Both of these can be bought over the counter.

## 2024-05-14 ENCOUNTER — OFFICE VISIT (OUTPATIENT)
Dept: FAMILY MEDICINE | Facility: CLINIC | Age: 24
End: 2024-05-14
Payer: COMMERCIAL

## 2024-05-14 VITALS
TEMPERATURE: 97.3 F | OXYGEN SATURATION: 99 % | DIASTOLIC BLOOD PRESSURE: 70 MMHG | HEIGHT: 70 IN | BODY MASS INDEX: 22.64 KG/M2 | RESPIRATION RATE: 21 BRPM | WEIGHT: 158.13 LBS | SYSTOLIC BLOOD PRESSURE: 110 MMHG | HEART RATE: 70 BPM

## 2024-05-14 DIAGNOSIS — N52.9 ERECTILE DYSFUNCTION, UNSPECIFIED ERECTILE DYSFUNCTION TYPE: ICD-10-CM

## 2024-05-14 DIAGNOSIS — F41.1 GAD (GENERALIZED ANXIETY DISORDER): Primary | ICD-10-CM

## 2024-05-14 PROCEDURE — 99214 OFFICE O/P EST MOD 30 MIN: CPT | Performed by: STUDENT IN AN ORGANIZED HEALTH CARE EDUCATION/TRAINING PROGRAM

## 2024-05-14 RX ORDER — SILDENAFIL CITRATE 20 MG/1
20-60 TABLET ORAL DAILY PRN
Qty: 60 TABLET | Refills: 0 | Status: SHIPPED | OUTPATIENT
Start: 2024-05-14

## 2024-05-14 RX ORDER — BUPROPION HYDROCHLORIDE 150 MG/1
150 TABLET ORAL EVERY MORNING
Qty: 30 TABLET | Refills: 1 | Status: SHIPPED | OUTPATIENT
Start: 2024-05-14 | End: 2024-07-15

## 2024-05-14 ASSESSMENT — ANXIETY QUESTIONNAIRES
1. FEELING NERVOUS, ANXIOUS, OR ON EDGE: NEARLY EVERY DAY
3. WORRYING TOO MUCH ABOUT DIFFERENT THINGS: MORE THAN HALF THE DAYS
IF YOU CHECKED OFF ANY PROBLEMS ON THIS QUESTIONNAIRE, HOW DIFFICULT HAVE THESE PROBLEMS MADE IT FOR YOU TO DO YOUR WORK, TAKE CARE OF THINGS AT HOME, OR GET ALONG WITH OTHER PEOPLE: SOMEWHAT DIFFICULT
5. BEING SO RESTLESS THAT IT IS HARD TO SIT STILL: NOT AT ALL
7. FEELING AFRAID AS IF SOMETHING AWFUL MIGHT HAPPEN: NOT AT ALL
GAD7 TOTAL SCORE: 7
GAD7 TOTAL SCORE: 7
6. BECOMING EASILY ANNOYED OR IRRITABLE: SEVERAL DAYS
2. NOT BEING ABLE TO STOP OR CONTROL WORRYING: SEVERAL DAYS

## 2024-05-14 ASSESSMENT — PAIN SCALES - GENERAL: PAINLEVEL: NO PAIN (0)

## 2024-05-14 ASSESSMENT — PATIENT HEALTH QUESTIONNAIRE - PHQ9: 5. POOR APPETITE OR OVEREATING: NOT AT ALL

## 2024-05-14 NOTE — PROGRESS NOTES
"  Assessment and Plan   23-year-old male who presents with erectile dysfunction over the last couple of months.  Seems to be psychogenic in etiology.  Related to anxiety.  Would be quite young for vascular etiology and does not have other symptoms to suggest hypogonadism.  He is taking \"pills\" from a gas station that his friend gave him which I think were likely Viagra.  He did have an erection lasting 12 hours at 1 time with this.  I discussed with him if he has an erection lasting more than 4 hours with medication he needs to go to the ER.  I recommended low-dose 20 mg to start with increasing as needed based on response.  May not need future pills as his confidence may improve with this.  We did discuss risks and benefits of treating his anxiety.  Patient opted to trial Wellbutrin which would have the least amount of sexual side effects.  I recommended he follow-up in a month to see how this is going.    1. BENIGNO (generalized anxiety disorder)  - buPROPion (WELLBUTRIN XL) 150 MG 24 hr tablet; Take 1 tablet (150 mg) by mouth every morning  Dispense: 30 tablet; Refill: 1    2. Erectile dysfunction, unspecified erectile dysfunction type  - sildenafil (REVATIO) 20 MG tablet; Take 1-3 tablets (20-60 mg) by mouth daily as needed (erectile dysfunction)  Dispense: 60 tablet; Refill: 0    Follow up: 1 month for BENIGNO    Options for treatment and follow-up care were reviewed with the patient and/or guardian. Zeferino Juarez and/or guardian engaged in the decision making process and verbalized understanding of the options discussed and agreed with the final plan.    Dr. Sergio Martinez         HPI:   Zeferino Juarez is a 23 year old  male who presents for:    Chief Complaint   Patient presents with    Erectile dysfunction     Patient tells me that over the last 7-8 times he has had intercourse with a woman he has had problems both getting and maintaining an erection.  He gets very anxious when this happens and is worried about his " "ability to perform.  He states his libido is normal.  He is not dating anyone in particular and is not fighting with any of these individuals.  He does feel anxiety in other parts of his life such as talking to new people or going into social situations.  No problems with school with anxiety or now that he has graduated with his interviews for work as an .  He tells me he tried a \"pill\" that his friend gave him from a gas station.  He does not know what it was.  But it seemed to work well.  In fact he actually had an erection lasting 12 hours.  He also notes he took one of his father's pills at a different time again does not know what it was and this did not work.  He has not tried anything else.  No previous damage to his penis or testicles.  No problems in the past with either. He denies weight loss or decreased muscle mass        5/14/2024     7:34 AM   BENIGNO-7 SCORE   Total Score 7              PMHX:   There is no problem list on file for this patient.      Social History     Tobacco Use    Smoking status: Never    Smokeless tobacco: Never    Tobacco comments:     vaping most days   Vaping Use    Vaping status: Never Used   Substance Use Topics    Alcohol use: Never    Drug use: Never       Social History     Social History Narrative    Not on file       No Known Allergies    No results found for this or any previous visit (from the past 24 hour(s)).         Review of Systems:    ROS: 10 point ROS neg other than the symptoms noted above in the HPI.         Physical Exam:     Vitals:    05/14/24 0703   BP: 110/70   Pulse: 70   Resp: 21   Temp: 97.3  F (36.3  C)   SpO2: 99%   Weight: 71.7 kg (158 lb 2 oz)   Height: 1.775 m (5' 9.88\")     Body mass index is 22.77 kg/m .    General appearance: Alert, cooperative, no distress, appears stated age  Head: Normocephalic, atraumatic, without obvious abnormality  Eyes: Pupils equal round, reactive.  Conjunctiva clear.  Nose: Nares normal, no drainage.  Throat: " Lips, mucosa, tongue normal mucosa pink and moist  Neck: Supple, symmetric, trachea midline,  Psych: Normal-appearing hygiene, speech is normal pace and volume, nontangential, noncircumferential, thoughtprocess is logical, does not appear to responding to internal stimuli, no expression of paranoid delusions, mood is anxious

## 2024-07-15 ENCOUNTER — OFFICE VISIT (OUTPATIENT)
Dept: FAMILY MEDICINE | Facility: CLINIC | Age: 24
End: 2024-07-15
Payer: COMMERCIAL

## 2024-07-15 VITALS
HEIGHT: 69 IN | DIASTOLIC BLOOD PRESSURE: 76 MMHG | SYSTOLIC BLOOD PRESSURE: 112 MMHG | BODY MASS INDEX: 22.01 KG/M2 | RESPIRATION RATE: 12 BRPM | WEIGHT: 148.6 LBS | OXYGEN SATURATION: 97 % | HEART RATE: 76 BPM

## 2024-07-15 DIAGNOSIS — N50.89 GENITAL LESION, MALE: Primary | ICD-10-CM

## 2024-07-15 DIAGNOSIS — Z11.3 SCREEN FOR STD (SEXUALLY TRANSMITTED DISEASE): ICD-10-CM

## 2024-07-15 LAB — T PALLIDUM AB SER QL: NONREACTIVE

## 2024-07-15 PROCEDURE — 86803 HEPATITIS C AB TEST: CPT | Performed by: FAMILY MEDICINE

## 2024-07-15 PROCEDURE — 99214 OFFICE O/P EST MOD 30 MIN: CPT | Performed by: FAMILY MEDICINE

## 2024-07-15 PROCEDURE — 87389 HIV-1 AG W/HIV-1&-2 AB AG IA: CPT | Performed by: FAMILY MEDICINE

## 2024-07-15 PROCEDURE — 87529 HSV DNA AMP PROBE: CPT | Performed by: FAMILY MEDICINE

## 2024-07-15 PROCEDURE — 87491 CHLMYD TRACH DNA AMP PROBE: CPT | Performed by: FAMILY MEDICINE

## 2024-07-15 PROCEDURE — 87340 HEPATITIS B SURFACE AG IA: CPT | Performed by: FAMILY MEDICINE

## 2024-07-15 PROCEDURE — 36415 COLL VENOUS BLD VENIPUNCTURE: CPT | Performed by: FAMILY MEDICINE

## 2024-07-15 PROCEDURE — 87591 N.GONORRHOEAE DNA AMP PROB: CPT | Performed by: FAMILY MEDICINE

## 2024-07-15 PROCEDURE — 86780 TREPONEMA PALLIDUM: CPT | Performed by: FAMILY MEDICINE

## 2024-07-15 NOTE — PROGRESS NOTES
Assessment & Plan     (N50.89) Genital lesion, male  (primary encounter diagnosis)  Comment: Acute onset of a genital lesion of unclear cause, is not classic for herpes and it is a solitary lesion  Plan: Hepatitis B surface antigen, Hepatitis C         antibody, HIV Antigen Antibody Combo Cascade,         Neisseria gonorrhoeae PCR, Chlamydia         trachomatis PCR, Treponema Abs w Reflex to RPR         and Titer, HSV Types 1 and 2 Qualitative PCR         CSF             (Z11.3) Screen for STD (sexually transmitted disease)  Comment: Patient has been potentially exposed  Plan: Hepatitis B surface antigen, Hepatitis C         antibody, HIV Antigen Antibody Combo Cascade,         Neisseria gonorrhoeae PCR, Chlamydia         trachomatis PCR, Treponema Abs w Reflex to RPR         and Titer             PLAN:  1.  Urine and laboratory studies as above, as well as a PCR swab and then proceed accordingly.                Rubia Mcgarry is a 23 year old, presenting for the following health issues:  Derm Problem (Genital Area - 1-2 weeks, slightly painful, has had history of these before 2 months ago. )        7/15/2024    10:14 AM   Additional Questions   Roomed by Maggie SALAS CMA   Accompanied by None     History of Present Illness       Reason for visit:  Blister in genital area  Symptom onset:  1-2 weeks ago  Symptoms include:  Blister  Symptom intensity:  Moderate  Symptom progression:  Staying the same  Had these symptoms before:  Yes  Has tried/received treatment for these symptoms:  No  What makes it worse:  No  What makes it better:  No    He eats 2-3 servings of fruits and vegetables daily.He consumes 1 sweetened beverage(s) daily.He exercises with enough effort to increase his heart rate 20 to 29 minutes per day.  He exercises with enough effort to increase his heart rate 3 or less days per week.   He is taking medications regularly.    Patient comes in because of a lesion in the glans penis.  Several months ago  "the patient had a similar lesion he treated this with over-the-counter Lotrimin cream which actually resolved the lesion couple weeks ago a similar appearing lesion started to appear, he has been applying the Lotrimin cream again but the lesion is not going away he may have scratched it there is a bit of a scab and eschar there and it is somewhat painful.  He did have sexual relations with a female probably several months ago or so, does not have a history of STDs.  Told the patient I would like to check him for STDs and also do a swab of the area.    Told the patient we will do a swab and do the blood and urine testing and then proceed accordingly.    Patient is otherwise healthy he is has been on some medication what looks like for anxiety but is not on it currently.                    Objective    /76   Pulse 76   Resp 12   Ht 1.765 m (5' 9.49\")   Wt 67.4 kg (148 lb 9.6 oz)   SpO2 97%   BMI 21.64 kg/m    Body mass index is 21.64 kg/m .  Physical Exam    examination.  The patient has a 5 mm or so slightly raised scab in the area of the glans penis is not draining at this time.              Signed Electronically by: Jose Juan Loving MD    "

## 2024-07-15 NOTE — LETTER
July 16, 2024      Zeferino Juarez  [address]  [place] [address]        Dear ,    We are writing to inform you of your test results.  As discussed the test comes back positive for herpes simplex type II which is the reason for the genital lesion, I faxed in a 10-day course of Valtrex to your local pharmacy, certainly if there is problems difficulties further outbreaks let us know.  -Discussed the rest of the testing for STDs are fortunately negative.        Resulted Orders   Hepatitis B surface antigen   Result Value Ref Range    Hepatitis B Surface Antigen Nonreactive Nonreactive   Hepatitis C antibody   Result Value Ref Range    Hepatitis C Antibody Nonreactive Nonreactive      Comment:      A nonreactive screening test result does not exclude the possibility of exposure to or infection with HCV. Nonreactive screening test results in individuals with prior exposure to HCV may be due to antibody levels below the limit of detection of this assay or lack of reactivity to the HCV antigens used in this assay. Patients with recent HCV infections (<3 months from time of exposure) may have false-negative HCV antibody results due to the time needed for seroconversion (average of 8 to 9 weeks).   HIV Antigen Antibody Combo Cascade   Result Value Ref Range    HIV Antigen Antibody Combo Nonreactive Nonreactive      Comment:      Negative HIV-1 p24 antigen and HIV-1/2 antibody screening test results usually indicate the absence of HIV-1 and HIV-2 infection. However, such negative results do not rule-out acute HIV infection.  If acute HIV-1 or HIV-2 infection is suspected, detection of HIV-1 or HIV-2 RNA  is recommended.    Neisseria gonorrhoeae PCR   Result Value Ref Range    Neisseria gonorrhoeae Negative Negative      Comment:      Negative for N. gonorrhoeae rRNA by transcription mediated amplification. A negative result by transcription mediated amplification does not preclude the presence of C. trachomatis  infection because results are dependent on proper and adequate collection, absence of inhibitors and sufficient rRNA to be detected.   Chlamydia trachomatis PCR   Result Value Ref Range    Chlamydia trachomatis Negative Negative      Comment:      A negative result by transcription mediated amplification does not preclude the presence of C. trachomatis infection because results are dependent on proper and adequate collection, absence of inhibitors and sufficient rRNA to be detected.   Treponema Abs w Reflex to RPR and Titer   Result Value Ref Range    Treponema Antibody Total Nonreactive Nonreactive   HSV Types 1 and 2 Qualitative PCR CSF   Result Value Ref Range    Herpes Simplex Virus 1 DNA Not Detected Not Detected      Comment:      Herpes simplex virus type 1 DNA not detected, presumed negative for HSV-1. A negative result does not rule out the presence of PCR inhibitors or HSV DNA in concentrations below the limit of detection.    Herpes Simplex Virus 2 DNA Detected (A) Not Detected    Narrative    The [employer] Molecular Simplexa HSV 1 & 2 Direct assay on the [employer] MDX instrument is a FDA-approved, real-time PCR test for the qualitative detection and differentiation of Herpes Simplex virus Type 1 & 2 DNA from patients with signs and symptoms of HSV-1 or 2 infection.       If you have any questions or concerns, please call the clinic at the number listed above.       Sincerely,      Jose Juan Loving MD

## 2024-07-16 DIAGNOSIS — N50.89 GENITAL LESION, MALE: Primary | ICD-10-CM

## 2024-07-16 LAB
C TRACH DNA SPEC QL NAA+PROBE: NEGATIVE
HBV SURFACE AG SERPL QL IA: NONREACTIVE
HCV AB SERPL QL IA: NONREACTIVE
HIV 1+2 AB+HIV1 P24 AG SERPL QL IA: NONREACTIVE
HSV1 DNA SPEC QL NAA+PROBE: NOT DETECTED
HSV2 DNA SPEC QL NAA+PROBE: DETECTED
N GONORRHOEA DNA SPEC QL NAA+PROBE: NEGATIVE

## 2024-07-16 RX ORDER — VALACYCLOVIR HYDROCHLORIDE 1 G/1
1000 TABLET, FILM COATED ORAL 2 TIMES DAILY
Qty: 20 TABLET | Refills: 0 | Status: SHIPPED | OUTPATIENT
Start: 2024-07-16 | End: 2024-08-06

## 2024-07-19 ENCOUNTER — MYC MEDICAL ADVICE (OUTPATIENT)
Dept: FAMILY MEDICINE | Facility: CLINIC | Age: 24
End: 2024-07-19
Payer: COMMERCIAL

## 2024-08-06 ENCOUNTER — OFFICE VISIT (OUTPATIENT)
Dept: FAMILY MEDICINE | Facility: CLINIC | Age: 24
End: 2024-08-06
Payer: COMMERCIAL

## 2024-08-06 VITALS
RESPIRATION RATE: 14 BRPM | DIASTOLIC BLOOD PRESSURE: 67 MMHG | SYSTOLIC BLOOD PRESSURE: 107 MMHG | BODY MASS INDEX: 22.81 KG/M2 | HEIGHT: 69 IN | HEART RATE: 77 BPM | TEMPERATURE: 98.6 F | WEIGHT: 154 LBS | OXYGEN SATURATION: 97 %

## 2024-08-06 DIAGNOSIS — N50.89 GENITAL LESION, MALE: ICD-10-CM

## 2024-08-06 DIAGNOSIS — B00.9 HSV (HERPES SIMPLEX VIRUS) INFECTION: Primary | ICD-10-CM

## 2024-08-06 DIAGNOSIS — R21 RASH: ICD-10-CM

## 2024-08-06 PROCEDURE — 99214 OFFICE O/P EST MOD 30 MIN: CPT | Performed by: FAMILY MEDICINE

## 2024-08-06 RX ORDER — VALACYCLOVIR HYDROCHLORIDE 1 G/1
1000 TABLET, FILM COATED ORAL 2 TIMES DAILY
Qty: 28 TABLET | Refills: 0 | Status: SHIPPED | OUTPATIENT
Start: 2024-08-06 | End: 2024-09-18

## 2024-08-06 RX ORDER — TRIAMCINOLONE ACETONIDE 1 MG/G
CREAM TOPICAL 2 TIMES DAILY
Qty: 45 G | Refills: 0 | Status: SHIPPED | OUTPATIENT
Start: 2024-08-06 | End: 2024-09-18

## 2024-08-06 NOTE — PROGRESS NOTES
Assessment & Plan     (B00.9) HSV (herpes simplex virus) infection  (primary encounter diagnosis)  Comment: Patient with a recent diagnosis of HSV type II  Plan: valACYclovir (VALTREX) 1000 mg tablet             (R21) Rash  Comment: Patient now with a rash in the facial area and other parts of the body which I think are likely due to HSV infection.  Plan: triamcinolone (KENALOG) 0.1 % external cream             (N50.89) Genital lesion, male  Comment: Patient initial lesion was in the genital area  Plan:      PLAN:  1.  Retreatment with Valtrex 1000 mg twice daily x 14 days  2.  Triamcinolone 0.1% twice daily  3.  If by mid August or so the patient is not experiencing significant improvement I would then refer to dermatology.                Rubia Mcgarry is a 23 year old, presenting for the following health issues:  Follow Up (HSV not getting better )        8/6/2024    11:33 AM   Additional Questions   Roomed by Albert X     History of Present Illness       Reason for visit:  Hsv syptoms all over face      Patient comes in for follow-up herpes and also new onset rash.  I saw the patient on July 15 he had a single solitary lesion on the penis, culture showed HSV type II, other tests for STDs were all negative I gave the patient a 10-day course of Valtrex.    Patient reports that the lesion of the penis is almost now resolved however about a week or 2 ago he broke out he has a fairly significant rash on his face even a few lesions on the right wrist and lower abdominal area they do lose somewhat there is a burning and dry sensation I do think that this is an outbreak of herpes most likely rather than some other skin conditions since I do have a recent diagnosis of this.    I discussed with the patient that I want to retreat him with Valtrex I am also going to give him a steroid cream to see if that can help with some of the inflammation.                          Objective    /67 (BP Location: Left arm,  "Patient Position: Sitting, Cuff Size: Adult Regular)   Pulse 77   Temp 98.6  F (37  C) (Oral)   Resp 14   Ht 1.765 m (5' 9.49\")   Wt 69.9 kg (154 lb)   SpO2 97%   BMI 22.42 kg/m    Body mass index is 22.42 kg/m .  Physical Exam   Skin examination the patient has a number of vesicles particularly under the lower lip scattered on the face, and similar-appearing lesions on the right wrist and lower abdominal area.              Signed Electronically by: Jose Juan Loving MD    "

## 2024-09-18 ENCOUNTER — MYC MEDICAL ADVICE (OUTPATIENT)
Dept: FAMILY MEDICINE | Facility: CLINIC | Age: 24
End: 2024-09-18
Payer: COMMERCIAL

## 2024-09-18 DIAGNOSIS — B00.9 HSV (HERPES SIMPLEX VIRUS) INFECTION: ICD-10-CM

## 2024-09-18 DIAGNOSIS — R21 RASH: ICD-10-CM

## 2024-09-18 RX ORDER — TRIAMCINOLONE ACETONIDE 1 MG/G
CREAM TOPICAL 2 TIMES DAILY
Qty: 45 G | Refills: 1 | Status: SHIPPED | OUTPATIENT
Start: 2024-09-18

## 2024-09-18 RX ORDER — VALACYCLOVIR HYDROCHLORIDE 1 G/1
1000 TABLET, FILM COATED ORAL 2 TIMES DAILY
Qty: 28 TABLET | Refills: 2 | Status: SHIPPED | OUTPATIENT
Start: 2024-09-18

## 2024-09-18 RX ORDER — VALACYCLOVIR HYDROCHLORIDE 1 G/1
1000 TABLET, FILM COATED ORAL 2 TIMES DAILY
Qty: 28 TABLET | Refills: 0 | OUTPATIENT
Start: 2024-09-18

## 2024-10-18 ENCOUNTER — OFFICE VISIT (OUTPATIENT)
Dept: FAMILY MEDICINE | Facility: CLINIC | Age: 24
End: 2024-10-18
Payer: COMMERCIAL

## 2024-10-18 VITALS
TEMPERATURE: 98.1 F | HEIGHT: 70 IN | RESPIRATION RATE: 9 BRPM | DIASTOLIC BLOOD PRESSURE: 66 MMHG | SYSTOLIC BLOOD PRESSURE: 105 MMHG | HEART RATE: 101 BPM | BODY MASS INDEX: 22.19 KG/M2 | WEIGHT: 155 LBS

## 2024-10-18 DIAGNOSIS — L08.9 LOCAL INFECTION OF SKIN AND SUBCUTANEOUS TISSUE: Primary | ICD-10-CM

## 2024-10-18 PROCEDURE — 99213 OFFICE O/P EST LOW 20 MIN: CPT | Mod: 25 | Performed by: NURSE PRACTITIONER

## 2024-10-18 PROCEDURE — 90471 IMMUNIZATION ADMIN: CPT | Performed by: NURSE PRACTITIONER

## 2024-10-18 PROCEDURE — 90715 TDAP VACCINE 7 YRS/> IM: CPT | Performed by: NURSE PRACTITIONER

## 2024-10-18 RX ORDER — CEPHALEXIN 500 MG/1
500 CAPSULE ORAL 3 TIMES DAILY
Qty: 21 CAPSULE | Refills: 0 | Status: SHIPPED | OUTPATIENT
Start: 2024-10-18 | End: 2024-10-22

## 2024-10-18 NOTE — PROGRESS NOTES
"  Assessment & Plan     Local infection of skin and subcutaneous tissue  *  - cephALEXin (KEFLEX) 500 MG capsule  Dispense: 21 capsule; Refill: 0    - We discussed his symptoms and warning signs. I offered xray to ensure no foreign product and he declined. No induration or drainage noted, thus no culture obtained. He took a pic with phone and I outlined the redness. I started him on keflex since no risk factors, and told him if not better by Sunday, he needs to get seen for possible culture, xray, etc.           Rubia Mcgarry is a 24 year old, presenting for the following health issues:  Possible infection   Works as an . His arm landed on a jimmy, long piece of mental (rebar) and felt a poke of \"some thing\" sharp. He stated there was no bleeding. Since this episode, there has been redness, and pain with touching the area. Denied fevers, drainage, etc. He is not sure if stung by a bug/bee.         10/18/2024     3:35 PM   Additional Questions   Roomed by Imani BURGOS     History of Present Illness       Reason for visit:  Cut myself on rebar at work and its infected  Symptom onset:  3-7 days ago   He is taking medications regularly.                     Objective    /66   Pulse 101   Temp 98.1  F (36.7  C)   Resp (!) 9   Ht 1.765 m (5' 9.5\")   Wt 70.3 kg (155 lb)   BMI 22.56 kg/m    Body mass index is 22.56 kg/m .  Physical Exam  Vitals and nursing note reviewed.   Constitutional:       Appearance: Normal appearance. He is normal weight.   HENT:      Mouth/Throat:      Mouth: Mucous membranes are moist.   Cardiovascular:      Rate and Rhythm: Normal rate.   Pulmonary:      Effort: Pulmonary effort is normal.   Abdominal:      Palpations: Abdomen is soft.   Musculoskeletal:        Arms:       Cervical back: Normal range of motion. No rigidity.      Right lower leg: No edema.      Left lower leg: No edema.      Comments:   Skin: Patient with small abrasion on his right elbow with center area " darker redness about dime size and surrounding skin pinkness. The center appears as a possible vector dot and this is tender. From the wound with surrounding redness, and warmth. No drainage, no induration noted.      Skin:     General: Skin is warm.      Capillary Refill: Capillary refill takes less than 2 seconds.      Coloration: Skin is not ashen, cyanotic, jaundiced, mottled, pale or sallow.      Findings: Erythema, signs of injury and wound present. No abscess, acne, bruising, ecchymosis, laceration, lesion, petechiae or rash. Rash is not crusting, macular, nodular, papular, purpuric, pustular, scaling, urticarial or vesicular.   Neurological:      General: No focal deficit present.      Mental Status: He is alert and oriented to person, place, and time.   Psychiatric:         Mood and Affect: Mood normal.         Behavior: Behavior normal.         Thought Content: Thought content normal.         Judgment: Judgment normal.                  Signed Electronically by: TEMI Kelsey CNP

## 2024-10-20 ENCOUNTER — OFFICE VISIT (OUTPATIENT)
Dept: FAMILY MEDICINE | Facility: CLINIC | Age: 24
End: 2024-10-20
Payer: COMMERCIAL

## 2024-10-20 VITALS
RESPIRATION RATE: 20 BRPM | DIASTOLIC BLOOD PRESSURE: 76 MMHG | HEART RATE: 92 BPM | SYSTOLIC BLOOD PRESSURE: 117 MMHG | TEMPERATURE: 99.1 F | OXYGEN SATURATION: 98 %

## 2024-10-20 DIAGNOSIS — L08.9 LOCAL INFECTION OF SKIN AND SUBCUTANEOUS TISSUE: Primary | ICD-10-CM

## 2024-10-20 PROCEDURE — 87070 CULTURE OTHR SPECIMN AEROBIC: CPT | Performed by: FAMILY MEDICINE

## 2024-10-20 PROCEDURE — 87077 CULTURE AEROBIC IDENTIFY: CPT | Performed by: FAMILY MEDICINE

## 2024-10-20 PROCEDURE — 99214 OFFICE O/P EST MOD 30 MIN: CPT | Performed by: FAMILY MEDICINE

## 2024-10-20 PROCEDURE — 87186 SC STD MICRODIL/AGAR DIL: CPT | Performed by: FAMILY MEDICINE

## 2024-10-20 RX ORDER — SULFAMETHOXAZOLE AND TRIMETHOPRIM 800; 160 MG/1; MG/1
1 TABLET ORAL 2 TIMES DAILY
Qty: 14 TABLET | Refills: 0 | Status: SHIPPED | OUTPATIENT
Start: 2024-10-20 | End: 2024-10-27

## 2024-10-20 NOTE — PROGRESS NOTES
Assessment:       Local infection of skin and subcutaneous tissue  - sulfamethoxazole-trimethoprim (BACTRIM DS) 800-160 MG tablet  Dispense: 14 tablet; Refill: 0  - Wound Aerobic Bacterial Culture Routine Without Gram Stain         Plan:     Patient with cut from rebar at work with infection that continues to get worse despite starting cephalexin 2 days ago.  Wound culture obtained today and will switch antibiotic to Bactrim.  Recommend very close follow-up and urged him to come back for reevaluation if getting worse or not improving in the next 2 days.  Patient is agreeable with this plan.  Will adjust antibiotic as needed based on the culture and sensitivities.    MEDICATIONS:   Orders Placed This Encounter   Medications    sulfamethoxazole-trimethoprim (BACTRIM DS) 800-160 MG tablet     Sig: Take 1 tablet by mouth 2 times daily for 7 days.     Dispense:  14 tablet     Refill:  0         Subjective:       24 year old male presents for evaluation of a wound on his right elbow that he sustained several days ago that got infected.  He was seen 2 days ago and started on cephalexin.  He is noticed increased swelling, redness, warmth, and pain around the site of the abrasion over the past 2 days despite being on the antibiotic.  He has not had a fever and otherwise feels okay.  Able to bend his elbow without pain or difficulty.    Patient Active Problem List   Diagnosis    HSV (herpes simplex virus) infection       Past Medical History:   Diagnosis Date    Lyme disease     2019 - resolved       Past Surgical History:   Procedure Laterality Date    NO PAST SURGERIES         Current Outpatient Medications   Medication Sig Dispense Refill    cephALEXin (KEFLEX) 500 MG capsule Take 1 capsule (500 mg) by mouth 3 times daily for 7 days. 21 capsule 0    sildenafil (REVATIO) 20 MG tablet Take 1-3 tablets (20-60 mg) by mouth daily as needed (erectile dysfunction) 60 tablet 0    sulfamethoxazole-trimethoprim (BACTRIM DS) 800-160  MG tablet Take 1 tablet by mouth 2 times daily for 7 days. 14 tablet 0    triamcinolone (KENALOG) 0.1 % external cream Apply topically 2 times daily. 45 g 1    valACYclovir (VALTREX) 1000 mg tablet Take 1 tablet (1,000 mg) by mouth 2 times daily. For 2 days. Keep additional pills for flare ups. 28 tablet 2     No current facility-administered medications for this visit.       No Known Allergies    Family History   Problem Relation Age of Onset    Anesthesia Reaction Mother     Amblyopia Sister     No Known Problems Mother     No Known Problems Father     No Known Problems Sister     No Known Problems Brother     No Known Problems Maternal Grandmother     No Known Problems Maternal Grandfather     Other - See Comments Paternal Grandmother         lung issue    No Known Problems Paternal Grandfather        Social History     Socioeconomic History    Marital status: Single   Tobacco Use    Smoking status: Never     Passive exposure: Never    Smokeless tobacco: Never    Tobacco comments:     vaping most days   Vaping Use    Vaping status: Every Day    Substances: Nicotine, Flavoring    Devices: Disposable   Substance and Sexual Activity    Alcohol use: Never    Drug use: Never    Sexual activity: Never     Social Determinants of Health     Interpersonal Safety: Low Risk  (5/1/2024)    Interpersonal Safety     Do you feel physically and emotionally safe where you currently live?: Yes     Within the past 12 months, have you been hit, slapped, kicked or otherwise physically hurt by someone?: No     Within the past 12 months, have you been humiliated or emotionally abused in other ways by your partner or ex-partner?: No         Review of Systems  Pertinent items are noted in HPI.      Objective:     /76   Pulse 92   Temp 99.1  F (37.3  C) (Oral)   Resp 20   SpO2 98%      General appearance: alert, appears stated age, and cooperative  Skin: Patient with small abrasion on his right elbow with swelling and some  drainage from the wound with surrounding redness, warmth, and tenderness, and swelling.      No results found for any visits on 10/20/24.    This note has been dictated using voice recognition software. Any grammatical or context distortions are unintentional and inherent to the software

## 2024-10-22 ENCOUNTER — APPOINTMENT (OUTPATIENT)
Dept: ULTRASOUND IMAGING | Facility: CLINIC | Age: 24
End: 2024-10-22
Attending: STUDENT IN AN ORGANIZED HEALTH CARE EDUCATION/TRAINING PROGRAM
Payer: COMMERCIAL

## 2024-10-22 ENCOUNTER — OFFICE VISIT (OUTPATIENT)
Dept: FAMILY MEDICINE | Facility: CLINIC | Age: 24
End: 2024-10-22
Payer: COMMERCIAL

## 2024-10-22 ENCOUNTER — HOSPITAL ENCOUNTER (EMERGENCY)
Facility: CLINIC | Age: 24
Discharge: HOME | End: 2024-10-22
Admitting: STUDENT IN AN ORGANIZED HEALTH CARE EDUCATION/TRAINING PROGRAM
Payer: COMMERCIAL

## 2024-10-22 VITALS
HEIGHT: 70 IN | HEART RATE: 88 BPM | SYSTOLIC BLOOD PRESSURE: 103 MMHG | TEMPERATURE: 98.3 F | OXYGEN SATURATION: 98 % | RESPIRATION RATE: 18 BRPM | DIASTOLIC BLOOD PRESSURE: 63 MMHG | WEIGHT: 155 LBS | BODY MASS INDEX: 22.19 KG/M2

## 2024-10-22 VITALS
SYSTOLIC BLOOD PRESSURE: 111 MMHG | HEART RATE: 79 BPM | BODY MASS INDEX: 22.56 KG/M2 | OXYGEN SATURATION: 98 % | TEMPERATURE: 98.5 F | WEIGHT: 155 LBS | DIASTOLIC BLOOD PRESSURE: 73 MMHG | RESPIRATION RATE: 16 BRPM
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
## 2024-10-22 DIAGNOSIS — S50.361A: ICD-10-CM

## 2024-10-22 DIAGNOSIS — L03.113 CELLULITIS OF RIGHT UPPER EXTREMITY: Primary | ICD-10-CM

## 2024-10-22 DIAGNOSIS — Z16.20 THERAPY FAILURE DUE TO ANTIBIOTIC RESISTANCE: ICD-10-CM

## 2024-10-22 LAB
ANION GAP SERPL CALCULATED.3IONS-SCNC: 13 MMOL/L (ref 7–15)
BACTERIA WND CULT: ABNORMAL
BASOPHILS # BLD AUTO: 0.1 10E3/UL (ref 0–0.2)
BASOPHILS NFR BLD AUTO: 1 %
BUN SERPL-MCNC: 14.1 MG/DL (ref 6–20)
CALCIUM SERPL-MCNC: 9.8 MG/DL (ref 8.8–10.4)
CHLORIDE SERPL-SCNC: 102 MMOL/L (ref 98–107)
CREAT SERPL-MCNC: 1.08 MG/DL (ref 0.67–1.17)
CRP SERPL-MCNC: 6.33 MG/L
EGFRCR SERPLBLD CKD-EPI 2021: >90 ML/MIN/1.73M2
EOSINOPHIL # BLD AUTO: 0.5 10E3/UL (ref 0–0.7)
EOSINOPHIL NFR BLD AUTO: 6 %
ERYTHROCYTE [DISTWIDTH] IN BLOOD BY AUTOMATED COUNT: 12 % (ref 10–15)
GLUCOSE SERPL-MCNC: 96 MG/DL (ref 70–99)
HCO3 SERPL-SCNC: 25 MMOL/L (ref 22–29)
HCT VFR BLD AUTO: 42.1 % (ref 40–53)
HGB BLD-MCNC: 14.4 G/DL (ref 13.3–17.7)
IMM GRANULOCYTES # BLD: 0 10E3/UL
IMM GRANULOCYTES NFR BLD: 0 %
LYMPHOCYTES # BLD AUTO: 1.8 10E3/UL (ref 0.8–5.3)
LYMPHOCYTES NFR BLD AUTO: 24 %
MCH RBC QN AUTO: 30.3 PG (ref 26.5–33)
MCHC RBC AUTO-ENTMCNC: 34.2 G/DL (ref 31.5–36.5)
MCV RBC AUTO: 88 FL (ref 78–100)
MONOCYTES # BLD AUTO: 0.6 10E3/UL (ref 0–1.3)
MONOCYTES NFR BLD AUTO: 7 %
NEUTROPHILS # BLD AUTO: 4.7 10E3/UL (ref 1.6–8.3)
NEUTROPHILS NFR BLD AUTO: 61 %
NRBC # BLD AUTO: 0 10E3/UL
NRBC BLD AUTO-RTO: 0 /100
PLATELET # BLD AUTO: 255 10E3/UL (ref 150–450)
POTASSIUM SERPL-SCNC: 4.1 MMOL/L (ref 3.4–5.3)
RBC # BLD AUTO: 4.76 10E6/UL (ref 4.4–5.9)
SODIUM SERPL-SCNC: 140 MMOL/L (ref 135–145)
WBC # BLD AUTO: 7.7 10E3/UL (ref 4–11)

## 2024-10-22 PROCEDURE — 99284 EMERGENCY DEPT VISIT MOD MDM: CPT | Mod: 25 | Performed by: STUDENT IN AN ORGANIZED HEALTH CARE EDUCATION/TRAINING PROGRAM

## 2024-10-22 PROCEDURE — 99214 OFFICE O/P EST MOD 30 MIN: CPT | Performed by: PHYSICIAN ASSISTANT

## 2024-10-22 PROCEDURE — 36415 COLL VENOUS BLD VENIPUNCTURE: CPT | Performed by: STUDENT IN AN ORGANIZED HEALTH CARE EDUCATION/TRAINING PROGRAM

## 2024-10-22 PROCEDURE — 86140 C-REACTIVE PROTEIN: CPT | Performed by: STUDENT IN AN ORGANIZED HEALTH CARE EDUCATION/TRAINING PROGRAM

## 2024-10-22 PROCEDURE — 85025 COMPLETE CBC W/AUTO DIFF WBC: CPT | Performed by: STUDENT IN AN ORGANIZED HEALTH CARE EDUCATION/TRAINING PROGRAM

## 2024-10-22 PROCEDURE — 93971 EXTREMITY STUDY: CPT | Mod: RT

## 2024-10-22 PROCEDURE — 80048 BASIC METABOLIC PNL TOTAL CA: CPT | Performed by: STUDENT IN AN ORGANIZED HEALTH CARE EDUCATION/TRAINING PROGRAM

## 2024-10-22 RX ORDER — ALBUTEROL SULFATE 90 UG/1
2 INHALANT RESPIRATORY (INHALATION) EVERY 6 HOURS PRN
COMMUNITY

## 2024-10-22 RX ORDER — GINSENG 100 MG
CAPSULE ORAL ONCE
Status: DISCONTINUED | OUTPATIENT
Start: 2024-10-22 | End: 2024-10-22 | Stop reason: HOSPADM

## 2024-10-22 ASSESSMENT — ACTIVITIES OF DAILY LIVING (ADL)
ADLS_ACUITY_SCORE: 35
ADLS_ACUITY_SCORE: 35

## 2024-10-22 ASSESSMENT — COLUMBIA-SUICIDE SEVERITY RATING SCALE - C-SSRS
6. HAVE YOU EVER DONE ANYTHING, STARTED TO DO ANYTHING, OR PREPARED TO DO ANYTHING TO END YOUR LIFE?: NO
1. IN THE PAST MONTH, HAVE YOU WISHED YOU WERE DEAD OR WISHED YOU COULD GO TO SLEEP AND NOT WAKE UP?: NO
2. HAVE YOU ACTUALLY HAD ANY THOUGHTS OF KILLING YOURSELF IN THE PAST MONTH?: NO

## 2024-10-22 NOTE — MEDICATION SCRIBE - ADMISSION MEDICATION HISTORY
Medication Scribe Admission Medication History    Admission medication history is complete. The information provided in this note is only as accurate as the sources available at the time of the update.    Information Source(s): Patient and CareEverywhere/SureScripts via in-person    Pertinent Information: Patient originally started on cephalexin 10/19 PM and was then switched to sulfamethoxazole-trimethoprim starting on 10/20 AM. Last dose of SMZ-TMP was taken 10/20 AM.     Has valacyclovir PRN for viral flares. Last taken sometime last week.     No OTC products reported, no known allergies.    Changes made to PTA medication list:  Added:   Albuterol HFA  Deleted:   Cephalexin 500 mg - switched abx  Changed:   Triamcinolone 0.1% cream BID --> BID PRN    Allergies reviewed with patient and updates made in EHR: yes    Medication History Completed By: Jay Martin 10/22/2024 5:58 PM    PTA Med List   Medication Sig Last Dose    albuterol (PROAIR HFA/PROVENTIL HFA/VENTOLIN HFA) 108 (90 Base) MCG/ACT inhaler Inhale 2 puffs into the lungs every 6 hours as needed for shortness of breath, wheezing or cough. Past Month at about a month ago    sildenafil (REVATIO) 20 MG tablet Take 1-3 tablets (20-60 mg) by mouth daily as needed (erectile dysfunction) More than a month at PRN    sulfamethoxazole-trimethoprim (BACTRIM DS) 800-160 MG tablet Take 1 tablet by mouth 2 times daily for 7 days. Past Week at 10/20 PM    triamcinolone (KENALOG) 0.1 % external cream Apply topically 2 times daily. (Patient taking differently: Apply topically 2 times daily as needed for irritation.) Past Month at PRN    valACYclovir (VALTREX) 1000 mg tablet Take 1 tablet (1,000 mg) by mouth 2 times daily. For 2 days. Keep additional pills for flare ups. Past Week at last week sometime

## 2024-10-22 NOTE — PROGRESS NOTES
Patient presents with:  Elbow Problem: Was seen on 10/20  for rt elbow skin infection taking medication feels  the redness around area ia getting larger      Clinical Decision Making:  Patient has had a culture which showed 3+ Staph aureus but has not been differentiate if his MRSA or MSSA.  Patient has been started on Keflex and then switched to Bactrim and has not had response to either antibiotic.  Patient has had worsening symptoms.  A differential diagnosis would include necrotizing fasciitis and is not responding to the staph aureus treatment.  The area was outlined with marking pen and dated and timed.  Images were obtained in the chart.  Patient will be n.p.o. and present to next higher level of care for evaluation of his worsening infection without response to treatment with outpatient antibiotics.      ICD-10-CM    1. Cellulitis of right upper extremity  L03.113       2. Therapy failure due to antibiotic resistance  Z16.20           Patient Instructions   Do not eat or drink anything by mouth    Resent to the emergency room for evaluation and treatment of your arm infection        HPI:  Zeferino Juarez is a 24 year old male who presents today for worsening infection of the right upper extremity.  Patient had sustained an injury to his right upper extremity bag having a puncture wound with a piece of rebar on 10/14/2024.  Subsequently he was seen in urgent care on 10/18/2024 and started on Keflex.  After 2 days on the antibiotic he return to urgent care on 10/20/2024 and was told to discontinue the Keflex and was started on Bactrim.  Patient is on antibiotic day 2.  He is following up because he is having increased pain increased redness and spreading of the cellulitis and redness on the right upper extremity.  Patient continued to have some drainage from the medial condyle of the distal humerus.  At this time he does not have fever chills night sweats nausea vomiting or other constitutional symptoms to  report.    Review of epic shows that the culture result from 10/20/2024 shows 3+ Staph aureus but has not had a sensitivity to differentiate between MRSA versus MSSA.     History obtained from chart review and the patient.    Problem List:  2024-08: HSV (herpes simplex virus) infection      Past Medical History:   Diagnosis Date    Lyme disease     2019 - resolved       Social History     Tobacco Use    Smoking status: Never     Passive exposure: Never    Smokeless tobacco: Never    Tobacco comments:     vaping most days   Substance Use Topics    Alcohol use: Never       Review of Systems  As above in HPI otherwise negative.    Vitals:    10/22/24 1534   BP: 111/73   Pulse: 79   Resp: 16   Temp: 98.5  F (36.9  C)   TempSrc: Oral   SpO2: 98%   Weight: 70.3 kg (155 lb)       General: Patient is resting comfortably no acute distress is afebrile  HEENT: Head is normocephalic atraumatic   eyes are PERRL EOMI sclera anicteric   TMs are clear bilaterally  Throat is with mild pharyngeal wall erythema and no exudate  No cervical lymphadenopathy present  LUNGS: Clear to auscultation bilaterally  HEART: Regular rate and rhythm  Skin: Without rash non-diaphoretic    Physical Exam            Labs:  Results for orders placed or performed during the hospital encounter of 10/22/24   CBC with platelets + differential     Status: None ()    Narrative    The following orders were created for panel order CBC with platelets + differential.  Procedure                               Abnormality         Status                     ---------                               -----------         ------                     CBC with platelets and d...[[id number]]                                                   Please view results for these tests on the individual orders.       At the end of the encounter, I discussed results, diagnosis, medications. Discussed red flags for immediate return to clinic/ER, as well as indications for follow up if no  improvement. Patient understood and agreed to plan. Patient was stable for discharge.

## 2024-10-22 NOTE — ED PROVIDER NOTES
Emergency Department Encounter   NAME: Zeferino Juarez ; AGE: 24 year old male ; YOB: 2000 ; MRN: [id number] ; PCP: Tricia Mejias   ED PROVIDER: Magdalena Arriaga PA-C    Evaluation Date & Time:   No admission date for patient encounter.    CHIEF COMPLAINT:  Cellulitis        Impression and Plan   FINAL IMPRESSION:    ICD-10-CM    1. Insect bite of right elbow with local reaction, initial encounter  S50.361A     W57.XXXA           ED Course and Medical Decision Making  Zeferino is a 24 year old male with PMH of HSV presenting to the emergency department for right arm cellulitis. Per chart review, patient was seen at his primary care clinic on 10/18/2024 for right redness and swelling. Patient states he was working outside and felt a sudden sharp pain in the inside of his right arm.  Denies any known injury or trauma.  Denies any bleeding to indicate that he cut himself on something. He was diagnosed with cellulitis on 10/18 and was started on Keflex.  He returned to his primary care office 2 days later with worsened redness despite starting Keflex.  Wound culture was obtained in clinic and he was switched to Bactrim.  Patient returned to his primary care office today as the redness still had not improved.  Patient denies any feelings of fever, chills, nausea, or vomiting.  Denies any pain of the arm unless he is touching the inside of the elbow.    Vitals reviewed and unremarkable, he is afebrile temp 98.3 F. On exam he is resting comfortably, non toxic appearing. Differential diagnosis/emergent conditions considered and evaluated for includes but not limited to insect bite, abscess, cellulitis, septic joint, DVT, superficial thrombophlebitis.     There is a large [place] marked on the inside of his right arm with marker to indicate area of erythema.  He has very light redness present just inside of this marked line running from his mid right bicep down to the middle of his forearm.  There is a small  raised 0.5cm circular edematous area on the inside of the right elbow with overlying purulent looking pustules. These are draining a small amount of fluid.  See photo below in physical exam section.  The rest of the upper arm is otherwise not edematous when compared bilaterally. He has full painless flexion and extension ROM of the right elbow, I do not suspect septic joint.     Wound culture from primary care office 2 days ago resulted with Staphylococcus aureus, no susceptibilities are available yet. There is no central fluctuance for incision and drainage today and if low suspicion for abscess underlying the pustules.     Ultrasound is negative for superficial thrombophlebitis or DVT.  I also did a bedside ultrasound to look for any fluid collection, I do not see any visible abscess for incision and drainage.  No areas of fluctuance on exam. Following US the pustules have spontaneously drained themselves and are no longer present. CBC is without leukocytosis.  He is afebrile today.  CRP very mildly elevated at 6.33.  Overall I have lower suspicion for cellulitis at this time but have recommended he continue taking Bactrim until he has finished his antibiotic course.  I suspect he may have a localized reaction to an insect bite.  A nonadherent dressing with antibiotic ointment was placed over the inside of the elbow with Ace wrap to provide compression.  I recommended he ice and elevate the arm to help with pain and swelling.  He will call his primary care office to schedule wound check in clinic in a few days.  He was given strict return precautions to the emergency department and is understanding and agreeable to this plan.      Supplemental history:  Obtained supplemental history:Supplemental history obtained?: No  Reviewed external records: External records reviewed?: Documented in chart and Outpatient Record: I personally performed chart review of office visit from 10/18 and 10/20/2024  Patient information  was obtained from: patient  Use of Intrepreter: N/A     Complicating Factors:  Care impacted by chronic illness:Documented in Chart  Care significantly affected by social determinants of health:N/A    Work Up:  Did you consider but not order tests?: Work up considered but not performed and documented in chart, if applicable  Did you interpret images independently?: Independent interpretation of ECG and images noted in documentation, when applicable.    External Consults:  Consultation discussion with other provider:Did you involve another provider (consultant, , pharmacy, etc.)?: No  Discharge. No recommendations on prescription strength medication(s). See documentation for any additional details.  I considered escalation of care and admitting the patient but ultimately did not given reassuring exam and workup.      ED COURSE:  4:13 PM I met and introduced myself to the patient. I gathered initial history and performed my physical exam. We discussed plan for initial workup.   6:31 PM I rechecked the patient and discussed results, discharge, follow up, and reasons to return to the ED.     At the conclusion of the encounter I discussed the results of all the tests and the disposition. The questions were answered. The patient or family acknowledged understanding and was agreeable with the care plan.        MEDICATIONS GIVEN IN THE EMERGENCY DEPARTMENT:  Medications   bacitracin ointment (has no administration in time range)         NEW PRESCRIPTIONS STARTED AT TODAY'S ED VISIT:  New Prescriptions    No medications on file         HPI     Zeferino Juarez is a 24 year old male with a pertinent history of HSV who presents to the ED by walk in for evaluation of right elbow pain and redness.     Patient reports that on 10/18/2024 while at work his right arm landed on a jimmy, long piece of metal and felt something sharp. He stated there was no bleeding or lacerations. Since then he had had redness and pain. He went to  "clinic twice and was started on 2 different antibiotics and says they didn't help. He rates his pain a 1/10 when at rest. He denies numbness and tingling in his fingertips. No fever, chills, nausea, vomiting.    Per chart review:  10/18/2024- Patient seen at [facility] for right elbow pain and redness and was started on Keflex.   10/20/2024- Returned to [facility] for continued redness and wound cultured showed 3+ Staphylococcus aureus. Patient was switched to Bactrim.        Medical History     Past Medical History:   Diagnosis Date    Lyme disease        Past Surgical History:   Procedure Laterality Date    NO PAST SURGERIES         Family History   Problem Relation Age of Onset    Anesthesia Reaction Mother     Amblyopia Sister     No Known Problems Mother     No Known Problems Father     No Known Problems Sister     No Known Problems Brother     No Known Problems Maternal Grandmother     No Known Problems Maternal Grandfather     Other - See Comments Paternal Grandmother         lung issue    No Known Problems Paternal Grandfather        Social History     Tobacco Use    Smoking status: Never     Passive exposure: Never    Smokeless tobacco: Never    Tobacco comments:     vaping most days   Vaping Use    Vaping status: Every Day    Substances: Nicotine, Flavoring    Devices: Disposable   Substance Use Topics    Alcohol use: Never    Drug use: Never       albuterol (PROAIR HFA/PROVENTIL HFA/VENTOLIN HFA) 108 (90 Base) MCG/ACT inhaler  sildenafil (REVATIO) 20 MG tablet  sulfamethoxazole-trimethoprim (BACTRIM DS) 800-160 MG tablet  triamcinolone (KENALOG) 0.1 % external cream  valACYclovir (VALTREX) 1000 mg tablet          Physical Exam     First Vitals:  Patient Vitals for the past 24 hrs:   BP Temp Temp src Pulse Resp SpO2 Height Weight   10/22/24 1602 131/80 98.3  F (36.8  C) Oral 93 16 99 % 1.778 m (5' 10\") 70.3 kg (155 lb)         PHYSICAL EXAM    General Appearance:  Alert, cooperative, no distress, " appears stated age. Non toxic appearing.  Musculoskeletal: Moving all extremities. No gross deformities.  5/5 strength bilaterally of the upper extremities.  No diminished sensation to light touch in the right upper extremity when compared to the left.  Radial pulse 2+ bilaterally.  Integument: Warm, dry. There is a large [place] marked on the inside of his right arm with marker to indicate area of erythema.  He has very light redness present just inside of this marked line running from his mid right bicep down to the middle of his forearm.  There is a small raised 0.5cm circular edematous area on the inside of the right elbow with overlying purulent looking pustules. These are draining a small amount of fluid.  See photo below in physical exam section.  The rest of the upper arm is otherwise not edematous when compared bilaterally.  Neurologic: Alert and orientated x3. No focal deficits.  Psych: Normal mood and affect                  Results     LAB:  All pertinent labs reviewed and interpreted  Labs Ordered and Resulted from Time of ED Arrival to Time of ED Departure   CRP INFLAMMATION - Abnormal       Result Value    CRP Inflammation 6.33 (*)    BASIC METABOLIC PANEL - Normal    Sodium 140      Potassium 4.1      Chloride 102      Carbon Dioxide (CO2) 25      Anion Gap 13      Urea Nitrogen 14.1      Creatinine 1.08      GFR Estimate >90      Calcium 9.8      Glucose 96     CBC WITH PLATELETS AND DIFFERENTIAL    WBC Count 7.7      RBC Count 4.76      Hemoglobin 14.4      Hematocrit 42.1      MCV 88      MCH 30.3      MCHC 34.2      RDW 12.0      Platelet Count 255      % Neutrophils 61      % Lymphocytes 24      % Monocytes 7      % Eosinophils 6      % Basophils 1      % Immature Granulocytes 0      NRBCs per 100 WBC 0      Absolute Neutrophils 4.7      Absolute Lymphocytes 1.8      Absolute Monocytes 0.6      Absolute Eosinophils 0.5      Absolute Basophils 0.1      Absolute Immature Granulocytes 0.0       Absolute NRBCs 0.0         RADIOLOGY:  US Upper Extremity Venous Duplex Right   Final Result   IMPRESSION:   1.  No deep venous thrombosis in the right upper extremity.            ECG:  N/A      PROCEDURES:  N/A      I, Marcellus Scott, am serving as a scribe to document services personally performed by Magdalena Arriaga PA-C, based on my observation and the provider's statements to me. I, Magdalena Arriaga PA-C attest that Marcellus Scott is acting in a scribe capacity, has observed my performance of the services and has documented them in accordance with my direction.       Magdalena Arriaga PA-C   Emergency Medicine   [facility] EMERGENCY ROOM       Magdalena Arriaga PA-C  10/23/24 0124

## 2024-10-22 NOTE — ED TRIAGE NOTES
Being treated with antibiotics for a right arm cellulitis.  Seen at clinic today and sent to ED for IV antibiotics.     Triage Assessment (Adult)       Row Name 10/22/24 7285          Triage Assessment    Airway WDL WDL        Respiratory WDL    Respiratory WDL WDL        Skin Circulation/Temperature WDL    Skin Circulation/Temperature WDL WDL        Cardiac WDL    Cardiac WDL WDL        Peripheral/Neurovascular WDL    Peripheral Neurovascular WDL WDL        Cognitive/Neuro/Behavioral WDL    Cognitive/Neuro/Behavioral WDL WDL

## 2024-10-22 NOTE — DISCHARGE INSTRUCTIONS
You were seen in the emergency department today for evaluation redness and what looks like a bug bite on the inside of your right arm.  Your lab work today is all unremarkable and reassuring.  You do not have an elevated white blood cell count or fever here today and your inflammatory markers are low.  Normal your inflammatory markers and white blood cell count would be high with a infection like cellulitis.    I suspect you have a localized reaction to an insect bite.   Do continue taking the Bactrim prescribed to you by your primary care office 2 days ago and finish this course in its entirety.  You can continue to place a clean bandage and antibiotic ointment over the sore on the inside of your elbow. Keep the area clean and dry.  Your arm was also wrapped with an ACE bandage. Ice and elevate the arm with the compression bandage on to help with swelling and redness.     Call your primary care office tomorrow to schedule follow-up end of the week or early next week.  Return to the emergency department if you develop any increased redness, increased pain of the area, increased swelling, fevers, or start to look or feel significantly more sick

## 2024-10-22 NOTE — PATIENT INSTRUCTIONS
Do not eat or drink anything by mouth    Resent to the emergency room for evaluation and treatment of your arm infection

## 2024-10-23 ENCOUNTER — PATIENT OUTREACH (OUTPATIENT)
Dept: FAMILY MEDICINE | Facility: CLINIC | Age: 24
End: 2024-10-23
Payer: COMMERCIAL

## 2024-10-23 NOTE — TELEPHONE ENCOUNTER
Transitions of Care Outreach  Chief Complaint   Patient presents with    Hospital F/U       Most Recent Admission Date: 10/22/2024   Most Recent Admission Diagnosis:      Most Recent Discharge Date: 10/22/2024   Most Recent Discharge Diagnosis: Insect bite of right elbow with local reaction, initial encounter - S50.361A, W57.XXXA     Transitions of Care Assessment    Discharge Assessment  How are you doing now that you are home?: about the same  How are your symptoms? (Red Flag symptoms escalate to triage hotline per guidelines): Unchanged  Do you know how to contact your clinic care team if you have future questions or changes to your health status? : Yes  Does the patient have their discharge instructions? : Yes  Does the patient have questions regarding their discharge instructions? : No  Were you started on any new medications or were there changes to any of your previous medications? : No  Does the patient have all of their medications?: Yes  Do you have questions regarding any of your medications? : No  Do you have all of your needed medical supplies or equipment (DME)?  (i.e. oxygen tank, CPAP, cane, etc.): Yes    Follow up Plan     Discharge Follow-Up  Discharge follow up appointment scheduled in alignment with recommended follow up timeframe or Transitions of Risk Category? (Low = within 30 days; Moderate= within 14 days; High= within 7 days): No  Patient's follow up appointment not scheduled: Patient declined scheduling support. Education on the importance of transitions of care follow up. Provided scheduling phone number.    No future appointments.    Outpatient Plan as outlined on AVS reviewed with patient.    For any urgent concerns, please contact our 24 hour nurse triage line: [phone] ([phone])       Brianna ACHARYA RN

## 2024-12-04 ENCOUNTER — MYC REFILL (OUTPATIENT)
Dept: FAMILY MEDICINE | Facility: CLINIC | Age: 24
End: 2024-12-04
Payer: COMMERCIAL

## 2024-12-04 DIAGNOSIS — N52.9 ERECTILE DYSFUNCTION, UNSPECIFIED ERECTILE DYSFUNCTION TYPE: ICD-10-CM

## 2024-12-04 RX ORDER — SILDENAFIL CITRATE 20 MG/1
20-60 TABLET ORAL DAILY PRN
Qty: 60 TABLET | Refills: 0 | Status: SHIPPED | OUTPATIENT
Start: 2024-12-04

## 2024-12-06 NOTE — TELEPHONE ENCOUNTER
Central Prior Authorization Team   Phone: [phone]    PA Initiation    Medication: SILDENAFIL 20 MG TABLET  Insurance Company: [employer] - Phone [phone] Fax [phone]  Pharmacy Filling the Rx: [id number] [place], [place] - [address] [place] [address]  Filling Pharmacy Phone: [phone]  Filling Pharmacy Fax:    Start Date: 12/6/2024

## 2024-12-09 RX ORDER — SILDENAFIL CITRATE 20 MG/1
20-60 TABLET ORAL DAILY PRN
Qty: 60 TABLET | Refills: 0 | Status: SHIPPED | OUTPATIENT
Start: 2024-12-09

## 2024-12-09 NOTE — TELEPHONE ENCOUNTER
PRIOR AUTHORIZATION DENIED    Medication: SILDENAFIL 20 MG TABLET    Denial Date: 12/9/2024    Denial Rational:

## 2025-02-05 ENCOUNTER — OFFICE VISIT (OUTPATIENT)
Dept: URGENT CARE | Facility: URGENT CARE | Age: 25
End: 2025-02-05
Payer: COMMERCIAL

## 2025-02-05 VITALS
DIASTOLIC BLOOD PRESSURE: 81 MMHG | TEMPERATURE: 100 F | HEART RATE: 121 BPM | WEIGHT: 155 LBS | OXYGEN SATURATION: 98 % | BODY MASS INDEX: 22.24 KG/M2 | RESPIRATION RATE: 18 BRPM | SYSTOLIC BLOOD PRESSURE: 145 MMHG

## 2025-02-05 DIAGNOSIS — J02.9 SORE THROAT: ICD-10-CM

## 2025-02-05 DIAGNOSIS — R50.9 FEVER, UNSPECIFIED FEVER CAUSE: Primary | ICD-10-CM

## 2025-02-05 LAB
DEPRECATED S PYO AG THROAT QL EIA: NEGATIVE
FLUAV AG SPEC QL IA: NEGATIVE
FLUBV AG SPEC QL IA: NEGATIVE
S PYO DNA THROAT QL NAA+PROBE: NOT DETECTED

## 2025-02-05 PROCEDURE — 99213 OFFICE O/P EST LOW 20 MIN: CPT | Performed by: PHYSICIAN ASSISTANT

## 2025-02-05 PROCEDURE — 87798 DETECT AGENT NOS DNA AMP: CPT | Performed by: PHYSICIAN ASSISTANT

## 2025-02-05 PROCEDURE — 87804 INFLUENZA ASSAY W/OPTIC: CPT

## 2025-02-05 PROCEDURE — 87651 STREP A DNA AMP PROBE: CPT | Performed by: PHYSICIAN ASSISTANT

## 2025-02-05 NOTE — LETTER
[facility]  [address] [address]  Phone: [phone]  Fax: [phone]    February 5, 2025        Zeferino Juarez  [address]  [place] [address]          To whom it may concern:    RE: Zeferino Juarez    He is excused from work for 2/3/2025 - 2/7/2025.  May return sooner as long as cough is improving and no fevers of 100.4 or higher.    Please contact me for questions or concerns.      Sincerely,      Hank Eaton

## 2025-02-06 LAB
B PARAPERT DNA SPEC QL NAA+PROBE: NOT DETECTED
B PERT DNA SPEC QL NAA+PROBE: NOT DETECTED

## 2025-02-06 NOTE — PROGRESS NOTES
Assessment & Plan:      Problem List Items Addressed This Visit    None  Visit Diagnoses       Fever, unspecified fever cause    -  Primary    Relevant Orders    Influenza A/B antigen (Completed)    Streptococcus A Rapid Screen w/Reflex to PCR - Clinic Collect (Completed)    Group A Streptococcus PCR Throat Swab    B. pertussis/parapertussis PCR-NP    Sore throat        Relevant Orders    Influenza A/B antigen (Completed)    Streptococcus A Rapid Screen w/Reflex to PCR - Clinic Collect (Completed)    Group A Streptococcus PCR Throat Swab          Medical Decision Making  Patient presents with severe cough, fevers, body aches for 3 to 4 days.  Rapid strep and influenza are negative.  Pertussis swab is in process.  Will contact patient only if pertussis swab is positive to discuss appropriate treatment at that time as needed.  Otherwise, symptoms are most consistent with a viral respiratory infection.  Continue with conservative measures.  Discussed treatment and symptomatic care.  Allergies and medication interactions reviewed.  Discussed signs of worsening symptoms and when to follow-up with PCP if no symptom improvement.     Subjective:      Zeferino Juarez is a 24 year old male here for evaluation of severe cough, fevers, body aches.  Onset of symptoms was 3 to 4 days ago.  Patient has had coughing to the point of emesis.  Patient said 3-4 episodes of emesis since onset.  Patient was recently exposed to a friend from [facility] who came home with a febrile illness but otherwise no significant cough.  Patient did not receive influenza vaccine this season.     The following portions of the patient's history were reviewed and updated as appropriate: allergies, current medications, and problem list.     Review of Systems  Pertinent items are noted in HPI.    Allergies  No Known Allergies    Family History   Problem Relation Age of Onset    Anesthesia Reaction Mother     Amblyopia Sister     No Known Problems Mother      No Known Problems Father     No Known Problems Sister     No Known Problems Brother     No Known Problems Maternal Grandmother     No Known Problems Maternal Grandfather     Other - See Comments Paternal Grandmother         lung issue    No Known Problems Paternal Grandfather        Social History     Tobacco Use    Smoking status: Never     Passive exposure: Never    Smokeless tobacco: Never    Tobacco comments:     vaping most days   Substance Use Topics    Alcohol use: Never        Objective:      BP (!) 145/81   Pulse (!) 121   Temp 100  F (37.8  C) (Oral)   Resp 18   Wt 70.3 kg (155 lb)   SpO2 98%   BMI 22.24 kg/m    General appearance - alert, well appearing, significant and frequent coughing in the room, otherwise nontoxic  Ears - bilateral TM's and external ear canals normal  Nose - normal and patent, no erythema, discharge or polyps  Mouth - posterior pharynx significant erythematous with moderate tonsil swelling bilaterally, no exudate  Neck - supple, no significant adenopathy  Chest - clear to auscultation, no wheezes, rales or rhonchi, symmetric air entry  Heart - normal rate, regular rhythm, normal S1, S2, no murmurs, rubs, clicks or gallops     Lab & Imaging Results    Results for orders placed or performed in visit on 02/05/25   Influenza A/B antigen     Status: Normal    Specimen: Nasopharyngeal; Swab   Result Value Ref Range    Influenza A antigen Negative Negative    Influenza B antigen Negative Negative    Narrative    Test results must be correlated with clinical data. If necessary, results should be confirmed by a molecular assay or viral culture.   Streptococcus A Rapid Screen w/Reflex to PCR - Clinic Collect     Status: Normal    Specimen: Throat; Swab   Result Value Ref Range    Group A Strep antigen Negative Negative       I personally reviewed these results and discussed findings with the patient.    The use of Dragon/myWebRoomation services was used to construct the content of  this note; any grammatical errors are non-intentional. Please contact the author directly if you are in need of any clarification.

## 2025-02-06 NOTE — PATIENT INSTRUCTIONS
Cough    You were seen today for a cough. This is likely due to a virus and will improve over the next 1-2 weeks on its own.    Symptom management:  - Drink plenty of non-caffeinated fluids  - Avoid smoke exposure  - May use tylenol or ibuprofen for discomfort  - Drink a warm non-caffeinated tea with honey  - Place a warm humidifier in your bedroom at night  - [employer] VaporRub    Reasons to return for re-evaluation:  - Develop a fever 100.4 or higher, current fever worsens, or fever does not improve in 72 hours  - Difficulty breathing or shortness of breath  - Cough continues to worsen including coughing up blood or coughing up thick, colored phlegm  - Unable to tolerate fluids    Otherwise, if symptoms have not improved in 7 days, follow-up with your primary care provider.

## 2025-02-10 ENCOUNTER — OFFICE VISIT (OUTPATIENT)
Dept: URGENT CARE | Facility: URGENT CARE | Age: 25
End: 2025-02-10
Payer: COMMERCIAL

## 2025-02-10 VITALS
TEMPERATURE: 99 F | OXYGEN SATURATION: 99 % | WEIGHT: 155 LBS | RESPIRATION RATE: 16 BRPM | SYSTOLIC BLOOD PRESSURE: 117 MMHG | DIASTOLIC BLOOD PRESSURE: 77 MMHG | BODY MASS INDEX: 22.24 KG/M2 | HEART RATE: 91 BPM

## 2025-02-10 DIAGNOSIS — R09.89 RALES: ICD-10-CM

## 2025-02-10 DIAGNOSIS — H66.001 NON-RECURRENT ACUTE SUPPURATIVE OTITIS MEDIA OF RIGHT EAR WITHOUT SPONTANEOUS RUPTURE OF TYMPANIC MEMBRANE: Primary | ICD-10-CM

## 2025-02-10 PROCEDURE — 87581 M.PNEUMON DNA AMP PROBE: CPT | Mod: 90 | Performed by: PHYSICIAN ASSISTANT

## 2025-02-10 PROCEDURE — 99214 OFFICE O/P EST MOD 30 MIN: CPT | Performed by: PHYSICIAN ASSISTANT

## 2025-02-10 PROCEDURE — 99000 SPECIMEN HANDLING OFFICE-LAB: CPT | Performed by: PHYSICIAN ASSISTANT

## 2025-02-10 RX ORDER — BENZONATATE 200 MG/1
200 CAPSULE ORAL 3 TIMES DAILY PRN
Qty: 21 CAPSULE | Refills: 0 | Status: SHIPPED | OUTPATIENT
Start: 2025-02-10

## 2025-02-10 NOTE — PROGRESS NOTES
Patient presents with:  Cough: WAS SEEN ON 2/5 STILL NOT FEELING WELL HAS CHILLS COUGHING TILL HE VOMITS CHEST HURTS EARS ARE PLUGGED      Clinical Decision Making:  Rales noted on exam.  Bulging and erythema noted on right tympanic membrane.  Will treat empirically with Augmentin.  Mycoplasma pneumoniae swab collected.  If mycoplasma test is positive I would recommend switching from Augmentin to azithromycin.  Patient also prescribed Tessalon Perles for comfort.      ICD-10-CM    1. Non-recurrent acute suppurative otitis media of right ear without spontaneous rupture of tympanic membrane  H66.001 amoxicillin-clavulanate (AUGMENTIN) 875-125 MG tablet      2. Rales  R09.89 Mycoplasma pheumoniae by PCR     benzonatate (TESSALON) 200 MG capsule          Patient Instructions   Given taking Augmentin twice per day with food.  You may continue with over-the-counter cough suppressants.  You may use your albuterol inhaler up to every 4-6 hours, but not more frequently than that.  You may take Tessalon Perles up to every 8 hours for cough suppression.  The mycoplasma test will come back in about 24 hours.  If it is positive we will call and switch your antibiotic from Augmentin to Azithromycin.  If it is negative you will be able to see that in your [employer] account and you will not receive a phone call.      HPI:  Zeferino Juarez is a 24 year old male who presents today with concerns of cough going on for the past 8 or 9 days.  Patient was seen on 2/5 and tested for influenza, strep, pertussis.  All of these tests were negative.  Illness was thought to be viral respiratory infection and supportive cares were recommended.  Patient is continuing to have chills, harsh cough to the point of vomiting, and bilateral ear plugging.    History obtained from the patient.    Problem List:  2024-08: HSV (herpes simplex virus) infection      Past Medical History:   Diagnosis Date    Lyme disease     2019 - resolved       Social History      Tobacco Use    Smoking status: Never     Passive exposure: Never    Smokeless tobacco: Never    Tobacco comments:     vaping most days   Substance Use Topics    Alcohol use: Never         Review of Systems    Vitals:    02/10/25 1602   BP: 117/77   Pulse: 91   Resp: 16   Temp: 99  F (37.2  C)   TempSrc: Oral   SpO2: 99%   Weight: 70.3 kg (155 lb)       Physical Exam  Vitals and nursing note reviewed.   Constitutional:       General: He is not in acute distress.     Appearance: He is not toxic-appearing or diaphoretic.   HENT:      Head: Normocephalic and atraumatic.      Right Ear: Ear canal and external ear normal.      Left Ear: Ear canal and external ear normal.      Ears:      Comments: Slight injection on the left tympanic membrane.  Bulging and erythema present on the right tympanic membrane.  No perforation bilaterally.  Eyes:      Conjunctiva/sclera: Conjunctivae normal.   Cardiovascular:      Rate and Rhythm: Normal rate and regular rhythm.      Heart sounds: No murmur heard.  Pulmonary:      Effort: Pulmonary effort is normal. No respiratory distress.      Breath sounds: No stridor. Rales (right lower lobe) present. No wheezing or rhonchi.   Neurological:      Mental Status: He is alert.   Psychiatric:         Mood and Affect: Mood normal.         Behavior: Behavior normal.         Thought Content: Thought content normal.         Judgment: Judgment normal.         At the end of the encounter, I discussed results, diagnosis, medications. Discussed red flags for immediate return to clinic/ER, as well as indications for follow up if no improvement. Patient understood and agreed to plan. Patient was stable for discharge.

## 2025-02-10 NOTE — PATIENT INSTRUCTIONS
Given taking Augmentin twice per day with food.  You may continue with over-the-counter cough suppressants.  You may use your albuterol inhaler up to every 4-6 hours, but not more frequently than that.  You may take Tessalon Perles up to every 8 hours for cough suppression.  The mycoplasma test will come back in about 24 hours.  If it is positive we will call and switch your antibiotic from Augmentin to Azithromycin.  If it is negative you will be able to see that in your [employer] account and you will not receive a phone call.

## 2025-02-13 LAB — M PNEUMO DNA SPEC QL NAA+PROBE: NOT DETECTED

## 2025-02-20 ENCOUNTER — E-VISIT (OUTPATIENT)
Dept: URGENT CARE | Facility: CLINIC | Age: 25
End: 2025-02-20
Payer: COMMERCIAL

## 2025-02-20 DIAGNOSIS — R05.1 ACUTE COUGH: Primary | ICD-10-CM

## 2025-02-20 NOTE — PATIENT INSTRUCTIONS
Dear Zeferino Juarez,    We are sorry you are not feeling well. Based on the responses you provided, it is recommended that you be seen in-person in urgent care so we can better evaluate your symptoms. Please click here to find the nearest urgent care location to you.   You will not be charged for this Visit. Thank you for trusting us with your care.    Nathalia Fitch PA-C

## 2025-02-21 ENCOUNTER — ANCILLARY PROCEDURE (OUTPATIENT)
Dept: GENERAL RADIOLOGY | Facility: CLINIC | Age: 25
End: 2025-02-21
Attending: FAMILY MEDICINE
Payer: COMMERCIAL

## 2025-02-21 PROCEDURE — 71046 X-RAY EXAM CHEST 2 VIEWS: CPT | Mod: TC | Performed by: RADIOLOGY

## 2025-03-09 ENCOUNTER — HEALTH MAINTENANCE LETTER (OUTPATIENT)
Age: 25
End: 2025-03-09

## 2025-04-09 ENCOUNTER — MYC MEDICAL ADVICE (OUTPATIENT)
Dept: FAMILY MEDICINE | Facility: CLINIC | Age: 25
End: 2025-04-09
Payer: COMMERCIAL

## 2025-04-09 DIAGNOSIS — N52.9 ERECTILE DYSFUNCTION, UNSPECIFIED ERECTILE DYSFUNCTION TYPE: ICD-10-CM

## 2025-04-10 ENCOUNTER — TELEPHONE (OUTPATIENT)
Dept: FAMILY MEDICINE | Facility: CLINIC | Age: 25
End: 2025-04-10

## 2025-04-10 RX ORDER — SILDENAFIL CITRATE 20 MG/1
20-60 TABLET ORAL DAILY PRN
Qty: 20 TABLET | Refills: 0 | Status: SHIPPED | OUTPATIENT
Start: 2025-04-10

## 2025-04-10 NOTE — TELEPHONE ENCOUNTER
Patient is due for a physical. Refilled sildenafil for 90 days. Please call and inform them and help make physical in this time for further refills.    Sergio Martinez MD

## 2025-04-14 NOTE — TELEPHONE ENCOUNTER
PRIOR AUTHORIZATION DENIED    Medication: SILDENAFIL CITRATE 20 MG PO TABS  Insurance Company: Other (see comments)  Denial Date: 4/10/2025  Denial Reason(s):       Appeal Information:         Patient Notified: NO

## 2025-04-26 ASSESSMENT — ASTHMA QUESTIONNAIRES: ACT_TOTALSCORE: 20

## 2025-04-28 ENCOUNTER — OFFICE VISIT (OUTPATIENT)
Dept: FAMILY MEDICINE | Facility: CLINIC | Age: 25
End: 2025-04-28
Payer: COMMERCIAL

## 2025-04-28 VITALS
HEART RATE: 77 BPM | OXYGEN SATURATION: 98 % | HEIGHT: 70 IN | DIASTOLIC BLOOD PRESSURE: 68 MMHG | SYSTOLIC BLOOD PRESSURE: 110 MMHG | BODY MASS INDEX: 22.36 KG/M2 | WEIGHT: 156.19 LBS | TEMPERATURE: 97.3 F

## 2025-04-28 DIAGNOSIS — N52.9 ERECTILE DYSFUNCTION, UNSPECIFIED ERECTILE DYSFUNCTION TYPE: ICD-10-CM

## 2025-04-28 DIAGNOSIS — Z76.0 MEDICATION REFILL: ICD-10-CM

## 2025-04-28 DIAGNOSIS — Z86.14 HISTORY OF MRSA INFECTION: ICD-10-CM

## 2025-04-28 DIAGNOSIS — Z00.00 VISIT FOR PREVENTIVE HEALTH EXAMINATION: Primary | ICD-10-CM

## 2025-04-28 DIAGNOSIS — R05.3 CHRONIC COUGH: ICD-10-CM

## 2025-04-28 LAB
ALBUMIN SERPL BCG-MCNC: 4.8 G/DL (ref 3.5–5.2)
ALP SERPL-CCNC: 73 U/L (ref 40–150)
ALT SERPL W P-5'-P-CCNC: 12 U/L (ref 0–70)
ANION GAP SERPL CALCULATED.3IONS-SCNC: 10 MMOL/L (ref 7–15)
AST SERPL W P-5'-P-CCNC: 25 U/L (ref 0–45)
BILIRUB SERPL-MCNC: 0.5 MG/DL
BUN SERPL-MCNC: 15.9 MG/DL (ref 6–20)
CALCIUM SERPL-MCNC: 9.6 MG/DL (ref 8.8–10.4)
CHLORIDE SERPL-SCNC: 103 MMOL/L (ref 98–107)
CHOLEST SERPL-MCNC: 154 MG/DL
CREAT SERPL-MCNC: 1.02 MG/DL (ref 0.67–1.17)
EGFRCR SERPLBLD CKD-EPI 2021: >90 ML/MIN/1.73M2
ERYTHROCYTE [DISTWIDTH] IN BLOOD BY AUTOMATED COUNT: 12 % (ref 10–15)
EST. AVERAGE GLUCOSE BLD GHB EST-MCNC: 97 MG/DL
FASTING STATUS PATIENT QL REPORTED: NORMAL
FASTING STATUS PATIENT QL REPORTED: NORMAL
GLUCOSE SERPL-MCNC: 95 MG/DL (ref 70–99)
HBA1C MFR BLD: 5 % (ref 0–5.6)
HCO3 SERPL-SCNC: 27 MMOL/L (ref 22–29)
HCT VFR BLD AUTO: 45.9 % (ref 40–53)
HDLC SERPL-MCNC: 65 MG/DL
HGB BLD-MCNC: 15.6 G/DL (ref 13.3–17.7)
LDLC SERPL CALC-MCNC: 70 MG/DL
MCH RBC QN AUTO: 30.2 PG (ref 26.5–33)
MCHC RBC AUTO-ENTMCNC: 34 G/DL (ref 31.5–36.5)
MCV RBC AUTO: 89 FL (ref 78–100)
NONHDLC SERPL-MCNC: 89 MG/DL
PLATELET # BLD AUTO: 219 10E3/UL (ref 150–450)
POTASSIUM SERPL-SCNC: 5 MMOL/L (ref 3.4–5.3)
PROT SERPL-MCNC: 6.9 G/DL (ref 6.4–8.3)
RBC # BLD AUTO: 5.17 10E6/UL (ref 4.4–5.9)
SODIUM SERPL-SCNC: 140 MMOL/L (ref 135–145)
TRIGL SERPL-MCNC: 96 MG/DL
TSH SERPL DL<=0.005 MIU/L-ACNC: 2.07 UIU/ML (ref 0.3–4.2)
WBC # BLD AUTO: 6.2 10E3/UL (ref 4–11)

## 2025-04-28 PROCEDURE — 83036 HEMOGLOBIN GLYCOSYLATED A1C: CPT | Performed by: NURSE PRACTITIONER

## 2025-04-28 PROCEDURE — 36415 COLL VENOUS BLD VENIPUNCTURE: CPT | Performed by: NURSE PRACTITIONER

## 2025-04-28 PROCEDURE — 99214 OFFICE O/P EST MOD 30 MIN: CPT | Mod: 25 | Performed by: NURSE PRACTITIONER

## 2025-04-28 PROCEDURE — 80053 COMPREHEN METABOLIC PANEL: CPT | Performed by: NURSE PRACTITIONER

## 2025-04-28 PROCEDURE — 85027 COMPLETE CBC AUTOMATED: CPT | Performed by: NURSE PRACTITIONER

## 2025-04-28 PROCEDURE — 84403 ASSAY OF TOTAL TESTOSTERONE: CPT | Performed by: NURSE PRACTITIONER

## 2025-04-28 PROCEDURE — 80061 LIPID PANEL: CPT | Performed by: NURSE PRACTITIONER

## 2025-04-28 PROCEDURE — 3074F SYST BP LT 130 MM HG: CPT | Performed by: NURSE PRACTITIONER

## 2025-04-28 PROCEDURE — 99395 PREV VISIT EST AGE 18-39: CPT | Performed by: NURSE PRACTITIONER

## 2025-04-28 PROCEDURE — 84443 ASSAY THYROID STIM HORMONE: CPT | Performed by: NURSE PRACTITIONER

## 2025-04-28 PROCEDURE — 3078F DIAST BP <80 MM HG: CPT | Performed by: NURSE PRACTITIONER

## 2025-04-28 RX ORDER — SILDENAFIL CITRATE 20 MG/1
20-40 TABLET ORAL DAILY PRN
Qty: 60 TABLET | Refills: 1 | Status: SHIPPED | OUTPATIENT
Start: 2025-04-28

## 2025-04-28 RX ORDER — SILDENAFIL CITRATE 20 MG/1
20-60 TABLET ORAL DAILY PRN
Qty: 20 TABLET | Refills: 1 | Status: CANCELLED | OUTPATIENT
Start: 2025-04-28

## 2025-04-28 NOTE — PROGRESS NOTES
Assessment & Plan     Erectile dysfunction, unspecified erectile dysfunction type  *I reviewed prior chart notes.  Unclear etiology for erectile dysfunction.  Will check labs to ensure no low testosterone, or any other lab abnormal disease.  CBC came back normal.  A1c came back normal.  I reviewed possible psychogenic causes with the patient today, and he denied any association.  May need further follow-up with urology.     - Hemoglobin A1c  - CBC with platelets  - Comprehensive metabolic panel (BMP + Alb, Alk Phos, ALT, AST, Total. Bili, TP)  - Lipid Profile (Chol, Trig, HDL, LDL calc)  - TSH with free T4 reflex  - Testosterone, total  - Hemoglobin A1c  - CBC with platelets  - Comprehensive metabolic panel (BMP + Alb, Alk Phos, ALT, AST, Total. Bili, TP)  - Lipid Profile (Chol, Trig, HDL, LDL calc)  - TSH with free T4 reflex  - Testosterone, total    Chronic cough  *No cough noted at visit today.  Possible triggers for a chronic cough was discussed such as asthma, allergies, postnasal drainage, acid reflux, stress anxiety, etc. he will keep a diary of symptoms and follow-up if needed.  Chest x-ray reviewed with patient.     Visit for preventive health examination  *    Medication refill  - sildenafil refilled    -History MRSA with last wound culture.  This was discussed with patient today.  If reoccurrence should get decolonization possibly.     Encounter Diagnoses   Name Primary?    Erectile dysfunction, unspecified erectile dysfunction type     Chronic cough     Visit for preventive health examination Yes    Medication refill     History of MRSA infection          Subjective   Zeferino is a 24 year old, presenting for the following health issues:  Medication Request and Cough (Comes and goes for few year, sometimes productive)    - sometimes okay ED, sometimes hard to maintain erection and/or getting one. Was taking four tablets, but now taking 1-2 pills before sex every time. No low libido.   - sometimes with  masterbation it is normal typically [facility].   - mental health is stable. Same relationship with one girl for one month. When he wakes up, it is normal, not needing medications.   - no premature ejaculation.  No history of depression or anxiety.   - Has been noticing a cough.  Can be worse with cold weather.  Not really using an inhaler.  Does not think it is associated with allergies or work environment.  No shortness of breath.  No GERD no postnasal drainage. .   - Wound culture indicated MRSA.  No reoccurrence.  No hospitalizations.  No frequent antibiotic use.         4/28/2025     8:16 AM   Additional Questions   Roomed by RAMIRO Quintanilla   Accompanied by self     History of Present Illness       Reason for visit:  Need a refil on sildenafil and was told to come in. I also have a chronic cough that i want checked out    He eats 0-1 servings of fruits and vegetables daily.He consumes 2 sweetened beverage(s) daily.He exercises with enough effort to increase his heart rate 60 or more minutes per day.  He exercises with enough effort to increase his heart rate 7 days per week.   He is taking medications regularly.            4/26/2025   Asthma   1.  In the past 4 weeks, how much of the time did your asthma keep you from getting as much done at work, school or at home? 5   2.  During the past 4 weeks, how often have you had shortness of breath? 5   3.  During the past 4 weeks, how often did your asthma symptoms (wheezing, coughing, shortness of breath, chest tightness or pain) wake you up at night or earlier than usual in the morning? 2   4.  During the past 4 weeks, how often have you used your rescue inhaler or nebulizer medication (such as albuterol)? 5   5.  How would you rate your asthma control during the past 4 weeks? 3   ACT TOTAL SCORE (Goal Greater than or Equal to 20) 20    In the past 12 months, how many times did you visit the emergency room for your asthma without being admitted to the hospital? 0   In  "the past 12 months, how many times were you hospitalized overnight because of your asthma? 0   Do you have a cough, wheezing or shortness of breath? (!) COUGH      What makes your asthma/breathing worse? Smoke    Upper respiratory illness    Humidity    Exercise or sports    Cold air   Do you want more information about how to use your inhaler? No       Patient-reported    Multiple values from one day are sorted in reverse-chronological order                       Objective    /68   Pulse 77   Temp 97.3  F (36.3  C)   Ht 1.778 m (5' 10\")   Wt 70.8 kg (156 lb 3 oz)   SpO2 98%   BMI 22.41 kg/m    Body mass index is 22.41 kg/m .  Physical Exam   GENERAL: alert and no distress  EYES: Eyes grossly normal to inspection, PERRL and conjunctivae and sclerae normal  HENT: ear canals and TM's normal, nose and mouth without ulcers or lesions  NECK: no adenopathy, no asymmetry, masses, or scars  RESP: lungs clear to auscultation - no rales, rhonchi or wheezes  CV: regular rate and rhythm, normal S1 S2, no S3 or S4, no murmur, click or rub, no peripheral edema  MS: no gross musculoskeletal defects noted, no edema  SKIN: no suspicious lesions or rashes  NEURO: Normal strength and tone, mentation intact and speech normal  PSYCH: mentation appears normal, affect normal/bright  LYMPH: no cervical, supraclavicular, axillary, or inguinal adenopathy    Office Visit on 02/10/2025   Component Date Value Ref Range Status    [facility] 02/10/2025 Not Detected   Final    NOT DETECTED - A negative result does not rule out the   presence of PCR inhibitors in the patient specimen or   assay specific nucleic acid in concentrations below the   level of detection by the assay.  INTERPRETIVE INFORMATION: Mycoplasma pneumoniae by PCR    This test was developed and its performance characteristics   determined by [facility]. It has not been cleared or   approved by the US Food and Drug Administration. This test   was performed in a CLIA " certified laboratory and is   intended for clinical purposes.  Performed By: [facility]  [address] [place] [id number]  : Clinton Underwood MD, PhD  CLIA Number: [id number]     Results for orders placed or performed in visit on 04/28/25   Hemoglobin A1c     Status: Normal   Result Value Ref Range    Estimated Average Glucose 97 <117 mg/dL    Hemoglobin A1C 5.0 0.0 - 5.6 %   CBC with platelets     Status: Normal   Result Value Ref Range    WBC Count 6.2 4.0 - 11.0 10e3/uL    RBC Count 5.17 4.40 - 5.90 10e6/uL    Hemoglobin 15.6 13.3 - 17.7 g/dL    Hematocrit 45.9 40.0 - 53.0 %    MCV 89 78 - 100 fL    MCH 30.2 26.5 - 33.0 pg    MCHC 34.0 31.5 - 36.5 g/dL    RDW 12.0 10.0 - 15.0 %    Platelet Count 219 150 - 450 10e3/uL     Results for orders placed or performed in visit on 04/28/25 (from the past 24 hours)   Hemoglobin A1c   Result Value Ref Range    Estimated Average Glucose 97 <117 mg/dL    Hemoglobin A1C 5.0 0.0 - 5.6 %   CBC with platelets   Result Value Ref Range    WBC Count 6.2 4.0 - 11.0 10e3/uL    RBC Count 5.17 4.40 - 5.90 10e6/uL    Hemoglobin 15.6 13.3 - 17.7 g/dL    Hematocrit 45.9 40.0 - 53.0 %    MCV 89 78 - 100 fL    MCH 30.2 26.5 - 33.0 pg    MCHC 34.0 31.5 - 36.5 g/dL    RDW 12.0 10.0 - 15.0 %    Platelet Count 219 150 - 450 10e3/uL           Signed Electronically by: TEMI Kelsey CNP

## 2025-04-28 NOTE — PATIENT INSTRUCTIONS
Skin and Soft-Tissue Infections in Community-Associated MRSA    Simple abscesses or boils may be managed with incision and drainage alone; more data are needed on the use of antibiotics in this setting. Antibiotics are recommended for patients who have abscesses associated with severe or extensive disease (e.g., multiple sites of infection) or rapid progression in the presence of associated cellulitis; signs and symptoms of systemic illness; associated comorbidities or immunosuppression; very young or very old age; abscesses in areas difficult to drain (e.g., face, hand, genitalia); associated septic phlebitis; or lack of response to incision and drainage alone. Empiric therapy for five to 10 days is recommended pending culture results for outpatients with purulent cellulitis. Infection from ?-hemolytic streptococci does not usually require empiric therapy. For those with nonpurulent cellulitis, five to 10 days of empiric therapy for ?-hemolytic streptococcal infection is recommended, based on the patient's clinical response. Empiric coverage for community-associated MRSA is recommended in patients who do not respond to beta-lactam antibiotics, and also may be considered in those with systemic toxicity.  Oral antibiotic options for treating skin and soft-tissue infections in patients with community-associated MRSA include clindamycin, trimethoprim/sulfamethoxazole (TMP/SMX; Bactrim, Septra), a tetracycline (doxycycline or minocycline [Minocin]), and linezolid (Zyvox). Options for treating both ?-hemolytic streptococci and community-associated MRSA include clindamycin alone, TMP/SMX or a tetracycline in combination with a beta-lactam antibiotic (e.g., amoxicillin), or linezolid alone. Rifampin is not recommended for use as a single agent or adjunctive therapy.  For hospitalized patients with complicated skin and soft-tissue infections (i.e., deeper soft-tissue infections, surgical or traumatic wound infection, major  abscesses, cellulitis, or infected ulcers and burns), empiric therapy for MRSA should be considered pending culture results, in addition to surgical debridement and broad-spectrum antibiotics. Empiric therapy options include intravenous vancomycin, linezolid (600 mg orally or intravenously twice per day), daptomycin (Cubicin; 4 mg per kg intravenously once per day), telavancin (Vibativ; 10 mg per kg intravenously once per day), or clindamycin (600 mg intravenously or orally three times per day). A beta-lactam antibiotic (e.g., cefazolin) may be considered in hospitalized patients with nonpurulent cellulitis. MRSA-active therapy may be modified if there is no clinical response. Treatment for seven to 14 days is recommended, but should be individualized to the patient's clinical response. Cultures from abscesses and other purulent infections are recommended in patients who have received antibiotic therapy, those with severe local infection or signs of systemic illness, and those who have not responded adequately to initial treatment. Cultures are also recommended if there is concern of a cluster or outbreak.  CHILDREN    In children with minor skin infections (e.g., impetigo) or secondarily infected lesions (e.g., eczema, ulcers, lacerations), treatment with mupirocin 2% topical cream (Bactroban) is recommended. Tetracyclines are not recommended for children younger than eight years. Vancomycin is recommended in hospitalized children. If the child is stable without ongoing bacteremia or intravascular infection, empiric therapy with clindamycin (10 to 13 mg per kg intravenously every six to eight hours for a total of 40 mg per kg per day) is an option if the resistance rate is less than 10 percent. If the strain is susceptible, transition to oral therapy is advised. Linezolid may be considered as an alternative (600 mg orally or intravenously twice per day for children 12 years and older; 10 mg per kg orally or  intravenously every eight hours for children younger than 12 years).  Recurrent MRSA Skin and Soft-Tissue Infections    Physicians should provide instructions on personal hygiene and wound care for patients with skin and soft-tissue infections. Patients should cover draining wounds with clean, dry bandages. Regular bathing is advised, as well as hand washing with soap and water or an alcohol-based hand gel, especially after touching infected skin or an item that has been in contact with a draining wound. Patients should also avoid reusing or sharing items that that have touched infected skin (e.g., disposable razors, linens, towels). Commercially available  or detergents should be used to clean high-touch surfaces (e.g., doorknobs, counters, bathtubs, toilet seats) that may come in contact with bare skin or uncovered infections.  Decolonization may be considered if a patient develops a recurrent infection despite good personal hygiene and wound care, or if other household members develop infections. Strategies for decolonization include nasal decolonization with mupirocin twice per day for five to 10 days, or nasal decolonization with mupirocin twice per day for five to 10 days plus topical body decolonization with a skin antiseptic solution (e.g., chlorhexidine [Peridex]) for five to 14 days or dilute bleach baths. Dilute bleach baths can be made with 1 teaspoon of bleach per 1 gallon of water (or one-fourth cup per one-fourth bathtub or 13 gallons of water) and are given for 15 minutes twice per week for three months. Oral antimicrobial therapy is recommended only for treating active infection and is not routinely recommended for decolonization. An oral agent in combination with rifampin, if the strain is susceptible, may be considered if infections recur despite these measures.

## 2025-04-29 LAB — TESTOST SERPL-MCNC: 493 NG/DL (ref 240–950)

## 2025-04-29 NOTE — RESULT ENCOUNTER NOTE
Hi     Your labs all look good and within normal ranges. So this is good news. I feel you are pretty young to be on a erectile dsyfunction medication so if you would like to see Urology, please let me know. I can place the referral. They can look further into the appropriateness of the medication and if any underlying problems    Isabelle

## 2025-06-20 DIAGNOSIS — N52.9 ERECTILE DYSFUNCTION, UNSPECIFIED ERECTILE DYSFUNCTION TYPE: ICD-10-CM

## 2025-06-20 NOTE — TELEPHONE ENCOUNTER
"Clinic RN: Please investigate patient's chart or contact patient if the information cannot be found because erectile dysfunction may need referral to urology, per note below:    Per OV notes from 4/28/25:    \"Hi      Your labs all look good and within normal ranges. So this is good news. I feel you are pretty young to be on a erectile dsyfunction medication so if you would like to see Urology, please let me know. I can place the referral. They can look further into the appropriateness of the medication and if any underlying problems     Isabelle\"          Melyssa Duff, RN on 6/20/2025 at 10:29 AM   "

## 2025-06-24 ENCOUNTER — MYC MEDICAL ADVICE (OUTPATIENT)
Dept: FAMILY MEDICINE | Facility: CLINIC | Age: 25
End: 2025-06-24
Payer: COMMERCIAL

## 2025-06-24 DIAGNOSIS — N52.9 ERECTILE DYSFUNCTION, UNSPECIFIED ERECTILE DYSFUNCTION TYPE: ICD-10-CM

## 2025-06-24 RX ORDER — SILDENAFIL CITRATE 20 MG/1
20-40 TABLET ORAL DAILY PRN
Qty: 60 TABLET | Refills: 0 | Status: SHIPPED | OUTPATIENT
Start: 2025-06-24

## 2025-06-24 NOTE — TELEPHONE ENCOUNTER
Please see patient's MyChart response to medication refill request.       LILO Ragsdale  [facility]

## 2025-06-24 NOTE — TELEPHONE ENCOUNTER
Please see patient's MyChart response to medication refill request.      LILO Ragsdale  [facility]

## 2025-06-25 RX ORDER — SILDENAFIL CITRATE 20 MG/1
20-40 TABLET ORAL DAILY PRN
Qty: 90 TABLET | Refills: 0 | Status: SHIPPED | OUTPATIENT
Start: 2025-06-25

## 2025-06-25 NOTE — TELEPHONE ENCOUNTER
Placed a refill but if patient would like for me to refill in the future needs to see me for annual not another provider. Also if problem persists should see me in clinic.     Sergio Martinez MD

## 2025-06-26 NOTE — CONFIDENTIAL NOTE
Contacts       Contact Date/Time Type Contact Phone/Fax    06/20/2025 07:30 AM CDT Interface (Incoming) [facility] [id number] [facility] - [place], [place] - [address] [place] [address] N (Pharmacy) [phone]    06/26/2025 02:32 PM CDT Phone (Outgoing) Zeferino Juarez (Self) [phone] (M)    Left Message           Attempted to reach patient to: Schedule an appointment    When patient returns call, please take this action: Assist with scheduling    Reason for the visit: Preventive    When to schedule: Within 90 days    Additional comments/info: ok to use approval required slot on PCP schedule. Must be in person.     If unable to schedule: Transfer to TC line (or update telephone encounter in after-hours)

## 2025-08-21 ENCOUNTER — OFFICE VISIT (OUTPATIENT)
Dept: FAMILY MEDICINE | Facility: CLINIC | Age: 25
End: 2025-08-21
Payer: COMMERCIAL

## 2025-08-21 VITALS
RESPIRATION RATE: 14 BRPM | DIASTOLIC BLOOD PRESSURE: 77 MMHG | HEART RATE: 72 BPM | TEMPERATURE: 98.7 F | BODY MASS INDEX: 24.39 KG/M2 | WEIGHT: 170 LBS | OXYGEN SATURATION: 99 % | SYSTOLIC BLOOD PRESSURE: 120 MMHG

## 2025-08-21 DIAGNOSIS — B02.9 HERPES ZOSTER WITHOUT COMPLICATION: ICD-10-CM

## 2025-08-21 DIAGNOSIS — R23.8 VESICULAR RASH: ICD-10-CM

## 2025-08-21 DIAGNOSIS — L74.0 HEAT RASH: Primary | ICD-10-CM

## 2025-08-21 RX ORDER — TRIAMCINOLONE ACETONIDE 0.25 MG/G
OINTMENT TOPICAL 2 TIMES DAILY
Qty: 80 G | Refills: 0 | Status: SHIPPED | OUTPATIENT
Start: 2025-08-21 | End: 2025-09-04

## 2025-08-21 RX ORDER — VALACYCLOVIR HYDROCHLORIDE 1 G/1
1000 TABLET, FILM COATED ORAL 3 TIMES DAILY
Qty: 21 TABLET | Refills: 0 | Status: SHIPPED | OUTPATIENT
Start: 2025-08-21 | End: 2025-08-28

## 2025-08-25 LAB — VZV DNA SPEC QL NAA+PROBE: NOT DETECTED
